# Patient Record
Sex: FEMALE | Race: WHITE | NOT HISPANIC OR LATINO | Employment: FULL TIME | URBAN - METROPOLITAN AREA
[De-identification: names, ages, dates, MRNs, and addresses within clinical notes are randomized per-mention and may not be internally consistent; named-entity substitution may affect disease eponyms.]

---

## 2023-08-29 PROBLEM — N76.0 BACTERIAL VAGINOSIS: Status: ACTIVE | Noted: 2019-04-30

## 2023-08-29 PROBLEM — R20.2 PARESTHESIAS: Status: ACTIVE | Noted: 2023-01-20

## 2023-08-29 PROBLEM — E55.9 VITAMIN D DEFICIENCY: Status: ACTIVE | Noted: 2021-03-10

## 2023-08-29 PROBLEM — D25.9 LEIOMYOMA OF UTERUS, UNSPECIFIED: Status: ACTIVE | Noted: 2018-09-18

## 2023-08-29 PROBLEM — M65.312 TRIGGER THUMB, LEFT THUMB: Status: ACTIVE | Noted: 2022-11-08

## 2023-08-29 PROBLEM — N92.6 IRREGULAR BLEEDING: Status: ACTIVE | Noted: 2023-08-29

## 2023-08-29 PROBLEM — N95.1 PERIMENOPAUSE: Status: ACTIVE | Noted: 2023-08-29

## 2023-08-29 PROBLEM — M25.50 ARTHRALGIA: Status: ACTIVE | Noted: 2021-03-10

## 2023-08-29 PROBLEM — R39.15 URINARY URGENCY: Status: ACTIVE | Noted: 2023-02-16

## 2023-08-29 PROBLEM — H65.90 FLUID LEVEL BEHIND TYMPANIC MEMBRANE: Status: ACTIVE | Noted: 2023-03-20

## 2023-08-29 PROBLEM — E03.9 HYPOTHYROIDISM: Status: ACTIVE | Noted: 2018-09-18

## 2023-08-29 PROBLEM — J32.9 SINUSITIS: Status: ACTIVE | Noted: 2023-02-28

## 2023-08-29 PROBLEM — M65.941 TENOSYNOVITIS OF RIGHT HAND: Status: ACTIVE | Noted: 2022-11-08

## 2023-08-29 PROBLEM — N89.4: Status: ACTIVE | Noted: 2023-08-29

## 2023-08-29 PROBLEM — R25.9 INVOLUNTARY MOVEMENTS: Status: ACTIVE | Noted: 2021-03-10

## 2023-08-29 PROBLEM — F41.9 CHRONIC ANXIETY: Status: ACTIVE | Noted: 2019-09-20

## 2023-08-29 PROBLEM — M65.9 TENOSYNOVITIS OF RIGHT HAND: Status: ACTIVE | Noted: 2022-11-08

## 2023-08-29 PROBLEM — N95.1 FEMALE CLIMACTERIC STATE: Status: ACTIVE | Noted: 2023-08-29

## 2023-08-29 PROBLEM — E78.5 HYPERLIPIDEMIA: Status: ACTIVE | Noted: 2018-09-18

## 2023-08-29 PROBLEM — K21.9 GASTROESOPHAGEAL REFLUX DISEASE: Status: ACTIVE | Noted: 2020-01-16

## 2023-08-29 PROBLEM — R00.2 PALPITATION: Status: ACTIVE | Noted: 2021-09-27

## 2023-08-29 PROBLEM — R25.3 MUSCLE TWITCHING: Status: ACTIVE | Noted: 2021-03-10

## 2023-08-29 PROBLEM — F41.9 ANXIETY DISORDER, UNSPECIFIED: Status: ACTIVE | Noted: 2018-09-18

## 2023-08-29 PROBLEM — S46.819A STRAIN OF TRAPEZIUS MUSCLE: Status: ACTIVE | Noted: 2021-03-10

## 2023-08-29 PROBLEM — N76.1 CHRONIC VAGINITIS: Status: ACTIVE | Noted: 2023-08-29

## 2023-08-29 PROBLEM — B96.89 BACTERIAL VAGINOSIS: Status: ACTIVE | Noted: 2019-04-30

## 2023-08-29 PROBLEM — H66.92 LEFT OTITIS MEDIA: Status: ACTIVE | Noted: 2023-07-08

## 2023-08-29 RX ORDER — OLOPATADINE HYDROCHLORIDE 2 MG/ML
SOLUTION/ DROPS OPHTHALMIC
COMMUNITY
Start: 2022-10-06

## 2023-08-29 RX ORDER — GLUCOSAM/CHONDRO/HERB 149/HYAL 750-100 MG
TABLET ORAL
COMMUNITY
Start: 2020-10-06

## 2023-08-29 RX ORDER — FLUTICASONE PROPIONATE 50 MCG
SPRAY, SUSPENSION (ML) NASAL
COMMUNITY
Start: 2023-06-22

## 2023-08-29 RX ORDER — FERROUS GLUCONATE 324(38)MG
TABLET ORAL
COMMUNITY
End: 2024-04-24 | Stop reason: SDUPTHER

## 2023-08-29 RX ORDER — LORATADINE 10 MG/1
TABLET ORAL
COMMUNITY
Start: 2022-10-06 | End: 2023-11-09 | Stop reason: WASHOUT

## 2023-08-29 RX ORDER — CHOLECALCIFEROL (VITAMIN D3) 25 MCG
TABLET ORAL
COMMUNITY
Start: 2020-10-06

## 2023-08-29 RX ORDER — MELOXICAM 15 MG/1
TABLET ORAL
COMMUNITY
Start: 2022-10-06

## 2023-08-29 RX ORDER — LEVOTHYROXINE SODIUM 75 UG/1
TABLET ORAL
COMMUNITY
End: 2023-11-27 | Stop reason: SDUPTHER

## 2023-08-29 RX ORDER — FAMOTIDINE 20 MG/1
TABLET, FILM COATED ORAL
COMMUNITY
End: 2023-11-09 | Stop reason: WASHOUT

## 2023-08-29 RX ORDER — FAMOTIDINE 10 MG/1
TABLET ORAL
COMMUNITY
Start: 2020-10-05

## 2023-08-29 RX ORDER — ESCITALOPRAM OXALATE 5 MG/1
TABLET ORAL
COMMUNITY
End: 2023-10-27 | Stop reason: SDUPTHER

## 2023-08-29 RX ORDER — TERCONAZOLE 4 MG/G
CREAM VAGINAL
COMMUNITY
Start: 2023-04-25 | End: 2023-11-09 | Stop reason: WASHOUT

## 2023-08-29 RX ORDER — CALCIUM CARBONATE/VITAMIN D3 600MG-5MCG
TABLET ORAL
COMMUNITY
Start: 2022-10-06

## 2023-08-29 RX ORDER — ESCITALOPRAM OXALATE 10 MG/1
TABLET ORAL
COMMUNITY
Start: 2023-02-21 | End: 2023-11-09 | Stop reason: WASHOUT

## 2023-08-29 RX ORDER — MULTIVIT WITH CALCIUM,IRON,MIN 18MG-0.4MG
TABLET ORAL
COMMUNITY

## 2023-09-20 ENCOUNTER — HOSPITAL ENCOUNTER (OUTPATIENT)
Dept: DATA CONVERSION | Facility: HOSPITAL | Age: 58
Discharge: HOME | End: 2023-09-20
Payer: COMMERCIAL

## 2023-09-20 DIAGNOSIS — E03.9 HYPOTHYROIDISM, UNSPECIFIED: ICD-10-CM

## 2023-09-20 DIAGNOSIS — Z00.00 ENCOUNTER FOR GENERAL ADULT MEDICAL EXAMINATION WITHOUT ABNORMAL FINDINGS: ICD-10-CM

## 2023-09-20 DIAGNOSIS — E55.9 VITAMIN D DEFICIENCY, UNSPECIFIED: ICD-10-CM

## 2023-09-20 DIAGNOSIS — E78.5 HYPERLIPIDEMIA, UNSPECIFIED: ICD-10-CM

## 2023-09-20 DIAGNOSIS — R20.2 PARESTHESIA OF SKIN: ICD-10-CM

## 2023-09-20 LAB
25(OH)D3 SERPL-MCNC: 38 NG/ML (ref 31–100)
ALBUMIN SERPL-MCNC: 4.4 GM/DL (ref 3.5–5)
ALBUMIN/GLOB SERPL: 1.6 RATIO (ref 1.5–3)
ALP BLD-CCNC: 92 U/L (ref 35–125)
ALT SERPL-CCNC: 13 U/L (ref 5–40)
ANION GAP SERPL CALCULATED.3IONS-SCNC: 10 MMOL/L (ref 0–19)
APPEARANCE PLAS: ABNORMAL
AST SERPL-CCNC: 17 U/L (ref 5–40)
BACTERIA UR QL AUTO: NEGATIVE
BASOPHILS # BLD AUTO: 0.04 K/UL (ref 0–0.22)
BASOPHILS NFR BLD AUTO: 0.8 % (ref 0–1)
BILIRUB SERPL-MCNC: 0.5 MG/DL (ref 0.1–1.2)
BILIRUB UR QL STRIP.AUTO: NEGATIVE
BUN SERPL-MCNC: 11 MG/DL (ref 8–25)
BUN/CREAT SERPL: 13.8 RATIO (ref 8–21)
CALCIUM SERPL-MCNC: 9.6 MG/DL (ref 8.5–10.4)
CHLORIDE SERPL-SCNC: 104 MMOL/L (ref 97–107)
CHOLEST SERPL-MCNC: 223 MG/DL (ref 133–200)
CHOLEST/HDLC SERPL: 5.2 RATIO
CLARITY UR: CLEAR
CO2 SERPL-SCNC: 28 MMOL/L (ref 24–31)
COLOR SPUN FLD: ABNORMAL
COLOR UR: ABNORMAL
CREAT SERPL-MCNC: 0.8 MG/DL (ref 0.4–1.6)
DEPRECATED RDW RBC AUTO: 39.8 FL (ref 37–54)
DIFFERENTIAL METHOD BLD: ABNORMAL
EOSINOPHIL # BLD AUTO: 0.14 K/UL (ref 0–0.45)
EOSINOPHIL NFR BLD: 3 % (ref 0–3)
ERYTHROCYTE [DISTWIDTH] IN BLOOD BY AUTOMATED COUNT: 11.9 % (ref 11.7–15)
FASTING STATUS PATIENT QL REPORTED: ABNORMAL
GFR SERPL CREATININE-BSD FRML MDRD: 87 ML/MIN/1.73 M2
GLOBULIN SER-MCNC: 2.7 G/DL (ref 1.9–3.7)
GLUCOSE SERPL-MCNC: 91 MG/DL (ref 65–99)
GLUCOSE UR STRIP.AUTO-MCNC: NEGATIVE MG/DL
HCT VFR BLD AUTO: 39.7 % (ref 36–44)
HDLC SERPL-MCNC: 43 MG/DL
HGB BLD-MCNC: 13.2 GM/DL (ref 12–15)
HGB UR QL STRIP.AUTO: 3 /HPF (ref 0–3)
HGB UR QL: NEGATIVE
HYALINE CASTS UR QL AUTO: 6 /LPF
IMM GRANULOCYTES # BLD AUTO: 0.01 K/UL (ref 0–0.1)
KETONES UR QL STRIP.AUTO: NEGATIVE
LDLC SERPL CALC-MCNC: 154 MG/DL (ref 65–130)
LEUKOCYTE ESTERASE UR QL STRIP.AUTO: ABNORMAL
LYMPHOCYTES # BLD AUTO: 1.83 K/UL (ref 1.2–3.2)
LYMPHOCYTES NFR BLD MANUAL: 38.9 % (ref 20–40)
MCH RBC QN AUTO: 30.1 PG (ref 26–34)
MCHC RBC AUTO-ENTMCNC: 33.2 % (ref 31–37)
MCV RBC AUTO: 90.6 FL (ref 80–100)
MONOCYTES # BLD AUTO: 0.5 K/UL (ref 0–0.8)
MONOCYTES NFR BLD MANUAL: 10.6 % (ref 0–8)
NEUTROPHILS # BLD AUTO: 2.19 K/UL
NEUTROPHILS # BLD AUTO: 2.19 K/UL (ref 1.8–7.7)
NEUTROPHILS.IMMATURE NFR BLD: 0.2 % (ref 0–1)
NEUTS SEG NFR BLD: 46.5 % (ref 50–70)
NITRITE UR QL STRIP.AUTO: NEGATIVE
NRBC BLD-RTO: 0 /100 WBC
PH UR STRIP.AUTO: 5.5 [PH] (ref 4.6–8)
PLATELET # BLD AUTO: 209 K/UL (ref 150–450)
PMV BLD AUTO: 10.7 CU (ref 7–12.6)
POTASSIUM SERPL-SCNC: 4.1 MMOL/L (ref 3.4–5.1)
PROT SERPL-MCNC: 7.1 G/DL (ref 5.9–7.9)
PROT UR STRIP.AUTO-MCNC: NEGATIVE MG/DL
RBC # BLD AUTO: 4.38 M/UL (ref 4–4.9)
REFLEX MICROSCOPIC (UA): ABNORMAL
SODIUM SERPL-SCNC: 142 MMOL/L (ref 133–145)
SP GR UR STRIP.AUTO: 1.02 (ref 1–1.03)
SQUAMOUS UR QL AUTO: ABNORMAL /HPF
TRIGL SERPL-MCNC: 129 MG/DL (ref 40–150)
TSH SERPL DL<=0.05 MIU/L-ACNC: 1.2 MIU/L (ref 0.27–4.2)
UNSPECIFIED CRY UR QL COMP ASSIST: ABNORMAL
UROBILINOGEN UR QL STRIP.AUTO: NORMAL MG/DL (ref 0–1)
VIT B12 SERPL-MCNC: 388 PG/ML (ref 211–946)
WBC # BLD AUTO: 4.7 K/UL (ref 4.5–11)
WBC #/AREA URNS AUTO: 15 /HPF (ref 0–3)

## 2023-09-25 LAB — VIT B6 SERPL-MCNC: NORMAL NG/ML

## 2023-10-06 ENCOUNTER — TELEPHONE (OUTPATIENT)
Dept: PRIMARY CARE | Facility: CLINIC | Age: 58
End: 2023-10-06
Payer: COMMERCIAL

## 2023-10-27 ENCOUNTER — TELEPHONE (OUTPATIENT)
Dept: PRIMARY CARE | Facility: CLINIC | Age: 58
End: 2023-10-27
Payer: COMMERCIAL

## 2023-10-27 DIAGNOSIS — F41.1 GENERALIZED ANXIETY DISORDER: ICD-10-CM

## 2023-10-27 RX ORDER — ESCITALOPRAM OXALATE 5 MG/1
5 TABLET ORAL DAILY
Qty: 90 TABLET | Refills: 1 | Status: SHIPPED | OUTPATIENT
Start: 2023-10-27 | End: 2023-11-09 | Stop reason: WASHOUT

## 2023-11-09 ENCOUNTER — OFFICE VISIT (OUTPATIENT)
Dept: OBSTETRICS AND GYNECOLOGY | Facility: CLINIC | Age: 58
End: 2023-11-09
Payer: COMMERCIAL

## 2023-11-09 VITALS
HEIGHT: 64 IN | BODY MASS INDEX: 25.27 KG/M2 | WEIGHT: 148 LBS | DIASTOLIC BLOOD PRESSURE: 62 MMHG | SYSTOLIC BLOOD PRESSURE: 118 MMHG

## 2023-11-09 DIAGNOSIS — Z12.12 SCREENING FOR COLORECTAL CANCER: ICD-10-CM

## 2023-11-09 DIAGNOSIS — Z01.419 WELL FEMALE EXAM WITH ROUTINE GYNECOLOGICAL EXAM: Primary | ICD-10-CM

## 2023-11-09 DIAGNOSIS — Z30.431 SURVEILLANCE OF INTRAUTERINE CONTRACEPTIVE DEVICE: ICD-10-CM

## 2023-11-09 DIAGNOSIS — Z11.51 SCREENING FOR HPV (HUMAN PAPILLOMAVIRUS): ICD-10-CM

## 2023-11-09 DIAGNOSIS — Z12.11 SCREENING FOR COLORECTAL CANCER: ICD-10-CM

## 2023-11-09 DIAGNOSIS — Z78.0 MENOPAUSE: ICD-10-CM

## 2023-11-09 DIAGNOSIS — R35.0 URINARY FREQUENCY: ICD-10-CM

## 2023-11-09 LAB
POC APPEARANCE, URINE: CLEAR
POC BILIRUBIN, URINE: NEGATIVE
POC BLOOD, URINE: NEGATIVE
POC COLOR, URINE: YELLOW
POC GLUCOSE, URINE: NEGATIVE MG/DL
POC KETONES, URINE: NEGATIVE MG/DL
POC LEUKOCYTES, URINE: ABNORMAL
POC NITRITE,URINE: NEGATIVE
POC PH, URINE: 6 PH
POC PROTEIN, URINE: NEGATIVE MG/DL
POC SPECIFIC GRAVITY, URINE: 1.01
POC UROBILINOGEN, URINE: 0.2 EU/DL

## 2023-11-09 PROCEDURE — 99396 PREV VISIT EST AGE 40-64: CPT | Performed by: OBSTETRICS & GYNECOLOGY

## 2023-11-09 PROCEDURE — 87624 HPV HI-RISK TYP POOLED RSLT: CPT | Performed by: OBSTETRICS & GYNECOLOGY

## 2023-11-09 PROCEDURE — 81003 URINALYSIS AUTO W/O SCOPE: CPT | Performed by: OBSTETRICS & GYNECOLOGY

## 2023-11-09 PROCEDURE — 88175 CYTOPATH C/V AUTO FLUID REDO: CPT | Mod: TC | Performed by: OBSTETRICS & GYNECOLOGY

## 2023-11-09 PROCEDURE — 87086 URINE CULTURE/COLONY COUNT: CPT | Performed by: OBSTETRICS & GYNECOLOGY

## 2023-11-09 PROCEDURE — 1036F TOBACCO NON-USER: CPT | Performed by: OBSTETRICS & GYNECOLOGY

## 2023-11-09 ASSESSMENT — ENCOUNTER SYMPTOMS
DIFFICULTY URINATING: 0
ADENOPATHY: 0
DYSURIA: 0
DIZZINESS: 0
ABDOMINAL PAIN: 0
CHEST TIGHTNESS: 0
ABDOMINAL DISTENTION: 0
SHORTNESS OF BREATH: 0
UNEXPECTED WEIGHT CHANGE: 0
FATIGUE: 0
HEADACHES: 0
ACTIVITY CHANGE: 0
WEAKNESS: 0
JOINT SWELLING: 0
COLOR CHANGE: 0

## 2023-11-09 ASSESSMENT — LIFESTYLE VARIABLES
HOW MANY STANDARD DRINKS CONTAINING ALCOHOL DO YOU HAVE ON A TYPICAL DAY: 1 OR 2
HOW OFTEN DO YOU HAVE A DRINK CONTAINING ALCOHOL: MONTHLY OR LESS
HOW OFTEN DO YOU HAVE SIX OR MORE DRINKS ON ONE OCCASION: NEVER
AUDIT-C TOTAL SCORE: 1
SKIP TO QUESTIONS 9-10: 1

## 2023-11-09 ASSESSMENT — PATIENT HEALTH QUESTIONNAIRE - PHQ9
SUM OF ALL RESPONSES TO PHQ9 QUESTIONS 1 & 2: 0
1. LITTLE INTEREST OR PLEASURE IN DOING THINGS: NOT AT ALL
2. FEELING DOWN, DEPRESSED OR HOPELESS: NOT AT ALL

## 2023-11-09 NOTE — PROGRESS NOTES
Annual-menopause  Subjective   Kalli Quintero is a 57 y.o. female who is here for a routine exam. Reports urinary frequency suspicious of bladder infection which she attributes to lack of hydration recently and opaque vaginal discharge. Also has been experiencing dyspareunia despite use of uberlube. Denies breast changes.  Periods: menopausal  Pain: periodic pelvic tenderness  Past Medical History       HYPOTHYROIDISM.       UTERINE FIBROIDS .       REFLUX.       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       Recurrent vaginitis;some yeast; also atrophic.  Surgical History        D&C per SP; NO discrete intracavitary lesions found 12/13/16  Last pap smear date 09/23/2020-neg,hpv-neg, 9/06/17 neg, hpv neg; 2012.   Mammogram dates 12/23/2021-neg, 08/11/2021- UNILAT RT mamm for pain/4mm cyst found;12/03/2020-neg, 11/19/19 neg, 11/27/18 BENIGN, 11/30/17 amelia; 11/2015.   Abnormal Mammogram 08/2021 4mm cyst RT breast.   Pelvic Ultrasound 10/2016 endometrial polyps(NEG h/s eval); both intramural/submucosal fibroids.   Birth control VASECTOMY, MIRENA INSERTED 4/20/17  Menarche 17.   LMP  6/19/2021 spotting/on Mirena. 2021 FSH 78.6; 2022 FSH 45  STDs NONE.   Sexual activity currently sexually active.   Total pregnancies  2.   Total live births  2---largest 9 lbs. ( No GDM).     Review of Systems   Constitutional:  Negative for activity change, fatigue and unexpected weight change.   Respiratory:  Negative for chest tightness and shortness of breath.    Cardiovascular:  Negative for chest pain and leg swelling.   Gastrointestinal:  Negative for abdominal distention and abdominal pain.   Genitourinary:  Positive for dyspareunia, frequency, urgency, vaginal discharge and vaginal pain. Negative for difficulty urinating, dysuria, genital sores, pelvic pain and vaginal bleeding.   Musculoskeletal:  Negative for gait problem and joint swelling.   Skin:  Negative for color change and rash.   Neurological:  Negative for dizziness, weakness and headaches.  "  Hematological:  Negative for adenopathy.   Objective   Visit Vitals  /62   Ht 1.626 m (5' 4\")   Wt 67.1 kg (148 lb)   LMP  (LMP Unknown)   BMI 25.40 kg/m²   OB Status Implant   Smoking Status Never   BSA 1.74 m²       General:   Alert and oriented, in no acute distress   Neck: Supple. No visible thyromegaly.    Breast/Axilla: Normal to palpation bilaterally without masses, skin changes, or nipple discharge.    Abdomen: Soft, non-tender, without masses or organomegaly   Vulva: Normal architecture without erythema, masses, or lesions.    Vagina: Mildly atrophic mucosa, improved on tx; egg white like discharge only at present; good mucosal moisture/nl vault capacity in canal; no lesions or blood .    Cervix: Normal without masses, lesions, or signs of cervicitis. Iud strings visible   Uterus:  Grossly normal mobile, non-enlarged uterus    Adnexa: No palp  masses or tenderness   Pelvic Floor No POP noted. No high tone pelvic floor    Psych  Rectal Normal affect. Normal mood.        Assessment/Plan    Encounter Diagnoses   Name Primary?    Well female exam with routine gynecological exam; grossly nl brst exam; gyn exam pos for ongoing but improved gsm. Pericare reviewed. Yes    Screening for HPV (human papillomavirus)     Menopause; no sgnf vaso sxs; gsm addressed.     Surveillance of intrauterine contraceptive device; no menses ; shared decision to continue due to hx mmr and well tolerated; pt would like to obtain full 7 yrs endometrial protection/suppression.     Screening for colorectal cancer; utd per pt     Urinary frequency; ua equivocal; will send for culture.    Racheal Nicolas MD    "

## 2023-11-10 PROBLEM — R35.0 URINARY FREQUENCY: Status: ACTIVE | Noted: 2023-11-10

## 2023-11-10 LAB — BACTERIA UR CULT: NORMAL

## 2023-11-10 ASSESSMENT — ENCOUNTER SYMPTOMS: FREQUENCY: 1

## 2023-11-27 ENCOUNTER — HOSPITAL ENCOUNTER (OUTPATIENT)
Dept: RADIOLOGY | Facility: HOSPITAL | Age: 58
Discharge: HOME | End: 2023-11-27
Payer: COMMERCIAL

## 2023-11-27 VITALS — BODY MASS INDEX: 24.92 KG/M2 | WEIGHT: 146 LBS | HEIGHT: 64 IN

## 2023-11-27 DIAGNOSIS — Z12.31 ENCOUNTER FOR SCREENING MAMMOGRAM FOR MALIGNANT NEOPLASM OF BREAST: ICD-10-CM

## 2023-11-27 PROCEDURE — 77067 SCR MAMMO BI INCL CAD: CPT

## 2023-11-30 LAB
CYTOLOGY CMNT CVX/VAG CYTO-IMP: NORMAL
HPV HR 12 DNA GENITAL QL NAA+PROBE: NEGATIVE
HPV HR GENOTYPES PNL CVX NAA+PROBE: NEGATIVE
HPV16 DNA SPEC QL NAA+PROBE: NEGATIVE
HPV18 DNA SPEC QL NAA+PROBE: NEGATIVE
LAB AP CONTRACEPTIVE HISTORY: NORMAL
LAB AP HPV GENOTYPE QUESTION: YES
LAB AP HPV HR: NORMAL
LABORATORY COMMENT REPORT: NORMAL
LMP START DATE: NORMAL
MENSTRUAL HX REPORTED: NORMAL
PATH REPORT.TOTAL CANCER: NORMAL

## 2024-02-09 ENCOUNTER — TELEPHONE (OUTPATIENT)
Dept: PRIMARY CARE | Facility: CLINIC | Age: 59
End: 2024-02-09
Payer: COMMERCIAL

## 2024-02-09 DIAGNOSIS — F41.9 ANXIETY DISORDER, UNSPECIFIED TYPE: ICD-10-CM

## 2024-02-09 RX ORDER — ESCITALOPRAM OXALATE 10 MG/1
5 TABLET ORAL DAILY
Qty: 45 TABLET | Refills: 1 | Status: SHIPPED | OUTPATIENT
Start: 2024-02-09 | End: 2024-02-13 | Stop reason: SDUPTHER

## 2024-02-09 NOTE — TELEPHONE ENCOUNTER
RX REQUEST   [facility]      PT WILL ALSO NEED  RX SENT TO [facility] TILL THIS COMES IN .

## 2024-02-13 ENCOUNTER — TELEPHONE (OUTPATIENT)
Dept: PRIMARY CARE | Facility: CLINIC | Age: 59
End: 2024-02-13
Payer: COMMERCIAL

## 2024-02-13 DIAGNOSIS — F41.9 ANXIETY DISORDER, UNSPECIFIED TYPE: ICD-10-CM

## 2024-02-13 RX ORDER — ESCITALOPRAM OXALATE 10 MG/1
5 TABLET ORAL DAILY
Qty: 7 TABLET | Refills: 0 | Status: SHIPPED | OUTPATIENT
Start: 2024-02-13 | End: 2024-05-17 | Stop reason: SDUPTHER

## 2024-02-13 NOTE — TELEPHONE ENCOUNTER
RX REQUEST  [facility]     PT  ONLY HAS ONE LEFT.   NEEDS SOME TILL HER MAIL ORDER COMES  IN .

## 2024-02-14 ENCOUNTER — TELEPHONE (OUTPATIENT)
Dept: PRIMARY CARE | Facility: CLINIC | Age: 59
End: 2024-02-14
Payer: COMMERCIAL

## 2024-02-14 NOTE — TELEPHONE ENCOUNTER
PLEASE UPDATE PHARMACY TO [facility] LAST 2 RX'S HAVE BEEN SENT TO [facility]. SHE DOESN'T NEED ANY MORE REFILLS AT THE MOMENT. JUST UPDATE PHARMACY

## 2024-03-27 ENCOUNTER — OFFICE VISIT (OUTPATIENT)
Dept: PRIMARY CARE | Facility: CLINIC | Age: 59
End: 2024-03-27
Payer: COMMERCIAL

## 2024-03-27 VITALS
HEART RATE: 72 BPM | SYSTOLIC BLOOD PRESSURE: 110 MMHG | WEIGHT: 148 LBS | BODY MASS INDEX: 25.4 KG/M2 | TEMPERATURE: 98.3 F | OXYGEN SATURATION: 98 % | DIASTOLIC BLOOD PRESSURE: 60 MMHG

## 2024-03-27 DIAGNOSIS — E03.8 OTHER SPECIFIED HYPOTHYROIDISM: ICD-10-CM

## 2024-03-27 DIAGNOSIS — J06.9 ACUTE URI: ICD-10-CM

## 2024-03-27 DIAGNOSIS — H66.002 NON-RECURRENT ACUTE SUPPURATIVE OTITIS MEDIA OF LEFT EAR WITHOUT SPONTANEOUS RUPTURE OF TYMPANIC MEMBRANE: Primary | ICD-10-CM

## 2024-03-27 PROCEDURE — 99214 OFFICE O/P EST MOD 30 MIN: CPT | Performed by: NURSE PRACTITIONER

## 2024-03-27 RX ORDER — AMOXICILLIN 875 MG/1
875 TABLET, FILM COATED ORAL 2 TIMES DAILY
Qty: 14 TABLET | Refills: 0 | Status: SHIPPED | OUTPATIENT
Start: 2024-03-27 | End: 2024-04-03

## 2024-03-27 ASSESSMENT — ENCOUNTER SYMPTOMS
SHORTNESS OF BREATH: 0
NAUSEA: 0
WHEEZING: 0
HEADACHES: 1
SORE THROAT: 0
VOMITING: 0
FATIGUE: 1
CHILLS: 0
COUGH: 1
SINUS PRESSURE: 1
DIARRHEA: 0
SINUS PAIN: 1
RHINORRHEA: 1
DIZZINESS: 0
FEVER: 0

## 2024-03-27 ASSESSMENT — PAIN SCALES - GENERAL: PAINLEVEL: 1

## 2024-03-27 NOTE — PROGRESS NOTES
Subjective   Patient ID: Kalli Quintero is a 58 y.o. female who presents for Earache (Bilateral ear pain, sinus pressure, congestion, and cough. X5 days.).    HPI   Kalli is a 57 yo F who presents with ear pain and sinus congestion for the past 5 days  Recnetly had Gi bug in [place] and came home 3/17  Sx began 3/22 - congestion, ear pain, sinus pressure  Home COVID19 x2 negative  Using Neti pot, pseudophed    Review of Systems   Constitutional:  Positive for fatigue. Negative for chills and fever.   HENT:  Positive for congestion, ear pain, postnasal drip, rhinorrhea, sinus pressure, sinus pain and sneezing. Negative for sore throat.    Respiratory:  Positive for cough. Negative for shortness of breath and wheezing.    Cardiovascular:  Negative for chest pain.   Gastrointestinal:  Negative for diarrhea, nausea and vomiting.   Skin:  Negative for rash.   Neurological:  Positive for headaches (foggy). Negative for dizziness.       Objective   /60   Pulse 72   Temp 36.8 °C (98.3 °F)   Wt 67.1 kg (148 lb)   SpO2 98%   BMI 25.40 kg/m²     Physical Exam  Gen: A/O x3 NAD  Eyes: WNL  ENT: Left TM erythemic and bulgingWith fluid level noted. Right TM dull. Auditory canals wnl. Bilat nares red and Patent with clear rhinorrhea. + Posterior pharynx erythema. No exudate. Uvula midline.   Lungs: Breath sounds Clear to auscultation bilaterally. No wheeze. Speaks complete sentences/Unlabored.  Heart: RRR, no murmur  Neck: supple, no adenopathy  Skin: warm/dry, no rash  Neuro: grossly intact    Assessment/Plan   Diagnoses and all orders for this visit:  Non-recurrent acute suppurative otitis media of left ear without spontaneous rupture of tympanic membrane  -     amoxicillin (Amoxil) 875 mg tablet; Take 1 tablet (875 mg) by mouth 2 times a day for 7 days.  Needs better control  Will cover with antibiotic due to otitis media  Add Flonase and continue antihistamines  Mucinex  Follow-up if not improving in 7 to 10 days  Acute  URI  Needs better control  Covered with antibiotic due to otitis media  OTC as directed  Fluids, rest, humidifier  Follow-up 7 to 10 days if not improving  Other specified hypothyroidism  -     TSH with reflex to Free T4 if abnormal; Future  Pt with hx of hypothyroid  Has been trying to follow grounding practice  Would like tsh checked

## 2024-04-22 ENCOUNTER — TELEPHONE (OUTPATIENT)
Dept: PRIMARY CARE | Facility: CLINIC | Age: 59
End: 2024-04-22

## 2024-04-22 DIAGNOSIS — Z00.00 ROUTINE MEDICAL EXAM: ICD-10-CM

## 2024-04-22 DIAGNOSIS — E03.8 OTHER SPECIFIED HYPOTHYROIDISM: ICD-10-CM

## 2024-04-22 DIAGNOSIS — E55.9 VITAMIN D DEFICIENCY: ICD-10-CM

## 2024-04-22 DIAGNOSIS — E78.5 HYPERLIPIDEMIA, UNSPECIFIED HYPERLIPIDEMIA TYPE: ICD-10-CM

## 2024-04-24 ENCOUNTER — TELEPHONE (OUTPATIENT)
Dept: PRIMARY CARE | Facility: CLINIC | Age: 59
End: 2024-04-24
Payer: COMMERCIAL

## 2024-04-24 DIAGNOSIS — N92.6 IRREGULAR BLEEDING: ICD-10-CM

## 2024-04-24 RX ORDER — FERROUS GLUCONATE 324(38)MG
TABLET ORAL
Qty: 90 TABLET | Refills: 0 | Status: SHIPPED | OUTPATIENT
Start: 2024-04-24

## 2024-04-24 NOTE — TELEPHONE ENCOUNTER
Patient came in requesting a refill for Ferrous Gluconate 324 mg.  Pharmacy: [facility]  Patient phone #: [phone]

## 2024-05-02 DIAGNOSIS — E03.9 HYPOTHYROIDISM, UNSPECIFIED TYPE: ICD-10-CM

## 2024-05-02 RX ORDER — LEVOTHYROXINE SODIUM 75 UG/1
75 TABLET ORAL
Qty: 90 TABLET | Refills: 1 | Status: SHIPPED | OUTPATIENT
Start: 2024-05-02

## 2024-06-11 ENCOUNTER — LAB (OUTPATIENT)
Dept: LAB | Facility: LAB | Age: 59
End: 2024-06-11
Payer: COMMERCIAL

## 2024-06-11 DIAGNOSIS — E03.8 OTHER SPECIFIED HYPOTHYROIDISM: ICD-10-CM

## 2024-06-11 LAB — TSH SERPL-ACNC: 1.38 MIU/L (ref 0.44–3.98)

## 2024-06-11 PROCEDURE — 36415 COLL VENOUS BLD VENIPUNCTURE: CPT

## 2024-06-13 ENCOUNTER — OFFICE VISIT (OUTPATIENT)
Dept: PRIMARY CARE | Facility: CLINIC | Age: 59
End: 2024-06-13
Payer: COMMERCIAL

## 2024-06-13 VITALS
WEIGHT: 150.4 LBS | OXYGEN SATURATION: 96 % | HEART RATE: 70 BPM | DIASTOLIC BLOOD PRESSURE: 70 MMHG | BODY MASS INDEX: 25.82 KG/M2 | SYSTOLIC BLOOD PRESSURE: 110 MMHG

## 2024-06-13 DIAGNOSIS — H69.93 EUSTACHIAN TUBE DYSFUNCTION, BILATERAL: ICD-10-CM

## 2024-06-13 DIAGNOSIS — H93.8X3 EAR FULLNESS, BILATERAL: Primary | ICD-10-CM

## 2024-06-13 PROCEDURE — 1036F TOBACCO NON-USER: CPT | Performed by: NURSE PRACTITIONER

## 2024-06-13 PROCEDURE — 99213 OFFICE O/P EST LOW 20 MIN: CPT | Performed by: NURSE PRACTITIONER

## 2024-06-13 RX ORDER — AMOXICILLIN 875 MG/1
875 TABLET, FILM COATED ORAL 2 TIMES DAILY
Qty: 20 TABLET | Refills: 0 | Status: SHIPPED | OUTPATIENT
Start: 2024-06-13 | End: 2024-06-23

## 2024-06-13 ASSESSMENT — PATIENT HEALTH QUESTIONNAIRE - PHQ9
2. FEELING DOWN, DEPRESSED OR HOPELESS: NOT AT ALL
SUM OF ALL RESPONSES TO PHQ9 QUESTIONS 1 AND 2: 0
1. LITTLE INTEREST OR PLEASURE IN DOING THINGS: NOT AT ALL

## 2024-06-13 ASSESSMENT — ENCOUNTER SYMPTOMS
SINUS PRESSURE: 0
CARDIOVASCULAR NEGATIVE: 1
RHINORRHEA: 0
SORE THROAT: 0
CHILLS: 0
FEVER: 0
SINUS PAIN: 0
GASTROINTESTINAL NEGATIVE: 1
DIZZINESS: 1
RESPIRATORY NEGATIVE: 1

## 2024-06-13 ASSESSMENT — PAIN SCALES - GENERAL: PAINLEVEL: 0-NO PAIN

## 2024-06-13 NOTE — PROGRESS NOTES
Subjective   Patient ID: Kalli Quintero is a 58 y.o. female who presents for Ear Fullness (Reports clogged ears, L>R, and being light headed x winter 2023 ).    HPI   Kalli is a 57 yo F who complains of ear fullness, clogged sensation that has been intermittently occurring this winter season. She occasionally has dizziness with position change. Using flonase, oral antihistamine and saline nasal spray daily. Is concerned she has infection and will be traveling to [place] next week.     Review of Systems   Constitutional:  Negative for chills and fever.   HENT:  Positive for ear pain. Negative for congestion, hearing loss, rhinorrhea, sinus pressure, sinus pain and sore throat.    Respiratory: Negative.     Cardiovascular: Negative.    Gastrointestinal: Negative.    Neurological:  Positive for dizziness.       Objective   /70 (BP Location: Left arm)   Pulse 70   Wt 68.2 kg (150 lb 6.4 oz)   SpO2 96%   BMI 25.82 kg/m²     Physical Exam  A/O x3 NAD  Head NC/AT  Eyes normal. EOMI/PERRL  Bilateral TM dull with decreased light reflex. No redness.   Nares pink/boggy. Oropharynx clear with MMM  Neck supple. No lymphadenopathy  Breathing unlabored  CN 2-12 intact. Dewayne    Assessment/Plan   Diagnoses and all orders for this visit:  Ear fullness, bilateral  -     amoxicillin (Amoxil) 875 mg tablet; Take 1 tablet (875 mg) by mouth 2 times a day for 10 days.  -     Referral to ENT; Future  Eustachian tube dysfunction, bilateral  -     amoxicillin (Amoxil) 875 mg tablet; Take 1 tablet (875 mg) by mouth 2 times a day for 10 days.  -     Referral to ENT; Future  Pt with ongoing intermittently occurring ear fullness. No evidence of OM  Will provide antibiotic to take while traveling overseas for worsening sx  Use flonase, antihistamine, self inflation exercise, afrin prior to air travel.  Due to ongoing nature of complaint, will refer to ENT for evaluation.   Pt in agreement with plan

## 2024-06-13 NOTE — PATIENT INSTRUCTIONS
Continue Flonase and oral antihistamine    Use the antibiotic while traveling overseas for worsening symptoms    See ENT as referred

## 2024-08-14 ENCOUNTER — TELEMEDICINE (OUTPATIENT)
Dept: PRIMARY CARE | Facility: CLINIC | Age: 59
End: 2024-08-14
Payer: COMMERCIAL

## 2024-08-14 ENCOUNTER — TELEPHONE (OUTPATIENT)
Dept: PRIMARY CARE | Facility: CLINIC | Age: 59
End: 2024-08-14
Payer: COMMERCIAL

## 2024-08-14 DIAGNOSIS — U07.1 COVID-19: Primary | ICD-10-CM

## 2024-08-14 PROCEDURE — 1036F TOBACCO NON-USER: CPT | Performed by: NURSE PRACTITIONER

## 2024-08-14 PROCEDURE — 99213 OFFICE O/P EST LOW 20 MIN: CPT | Performed by: NURSE PRACTITIONER

## 2024-08-14 ASSESSMENT — ENCOUNTER SYMPTOMS
CHEST TIGHTNESS: 1
SINUS PAIN: 1
NAUSEA: 0
HEADACHES: 1
CONSTIPATION: 0
COUGH: 1
SINUS PRESSURE: 1
VOMITING: 0
ARTHRALGIAS: 1
DIARRHEA: 1
SORE THROAT: 1
FEVER: 1
RHINORRHEA: 1
SHORTNESS OF BREATH: 0
EYE DISCHARGE: 1
FATIGUE: 1
POLYPHAGIA: 1

## 2024-08-14 ASSESSMENT — PATIENT HEALTH QUESTIONNAIRE - PHQ9
1. LITTLE INTEREST OR PLEASURE IN DOING THINGS: NOT AT ALL
2. FEELING DOWN, DEPRESSED OR HOPELESS: NOT AT ALL
SUM OF ALL RESPONSES TO PHQ9 QUESTIONS 1 AND 2: 0

## 2024-08-14 ASSESSMENT — PAIN SCALES - GENERAL: PAINLEVEL: 0-NO PAIN

## 2024-08-14 NOTE — PROGRESS NOTES
Subjective   Patient ID: Kalli Quintero is a 58 y.o. female who presents for Covid-19 Home Monitoring Visit (Positive covid test 8/13/Symptoms started 8/9/Sinus congestion, Sore throat, cough, body aches, headache /Had fever on 8/13).    HPI   With patient's permission, this is a Telemedicine visit with video. Provider located at the office address. Patient located at their home address. The provider, patient, and ____ participated in this telemedicine encounter     Sx began 8/9/24   Tested positive on 8/13/24  Has used eye gtts, ibuprofen, flonase, mucinex, cough suppressant  Son was positive for covid 19 prior to her symptom onset    Review of Systems   Constitutional:  Positive for fatigue and fever (as high as 101.5).   HENT:  Positive for congestion, ear pain, postnasal drip, rhinorrhea, sinus pressure, sinus pain and sore throat.    Eyes:  Positive for discharge (watery eyes).   Respiratory:  Positive for cough (nonproductive) and chest tightness. Negative for shortness of breath.    Cardiovascular:  Negative for chest pain.   Gastrointestinal:  Positive for diarrhea (sotfer, more frequent). Negative for constipation, nausea and vomiting.   Endocrine: Positive for polyphagia.   Musculoskeletal:  Positive for arthralgias.   Skin:  Negative for rash.   Neurological:  Positive for headaches.       Objective   There were no vitals taken for this visit.    Physical Exam  Alert oriented x3, no acute distress  breathing unlabored  Speaks full sentences  Answers appropriately  Skin appears normal color  Neuro grossly intact  Exam limited due to tele med platform     Assessment/Plan   Diagnoses and all orders for this visit:  COVID-19  Acute  Pt positive for covid 19 with home test  OTC as directed  Paxlovid rx discussed and deferred at this time  Quarantine guidelines discussed  Follow up 7-10 days INB  Indications to go to ED reviewed

## 2024-08-14 NOTE — TELEPHONE ENCOUNTER
Patient called answering service 8-13 at 703 pm tested + for covid fever 101  Per Keshia spoke to pt discussed SX relief options she is interested in paxlovid advised her to diane for an appt tomorrow with PCP to discuss prescription stable today no SOB or C

## 2024-08-20 ENCOUNTER — APPOINTMENT (OUTPATIENT)
Dept: PRIMARY CARE | Facility: CLINIC | Age: 59
End: 2024-08-20
Payer: COMMERCIAL

## 2024-08-20 VITALS
OXYGEN SATURATION: 99 % | WEIGHT: 154 LBS | DIASTOLIC BLOOD PRESSURE: 78 MMHG | BODY MASS INDEX: 26.43 KG/M2 | SYSTOLIC BLOOD PRESSURE: 130 MMHG | TEMPERATURE: 97.6 F | HEART RATE: 91 BPM

## 2024-08-20 DIAGNOSIS — H65.93 FLUID LEVEL BEHIND TYMPANIC MEMBRANE OF BOTH EARS: Primary | ICD-10-CM

## 2024-08-20 PROCEDURE — 99213 OFFICE O/P EST LOW 20 MIN: CPT | Performed by: NURSE PRACTITIONER

## 2024-08-20 ASSESSMENT — PAIN SCALES - GENERAL: PAINLEVEL: 0-NO PAIN

## 2024-08-20 ASSESSMENT — PATIENT HEALTH QUESTIONNAIRE - PHQ9
1. LITTLE INTEREST OR PLEASURE IN DOING THINGS: NOT AT ALL
SUM OF ALL RESPONSES TO PHQ9 QUESTIONS 1 AND 2: 0
2. FEELING DOWN, DEPRESSED OR HOPELESS: NOT AT ALL

## 2024-08-20 NOTE — PROGRESS NOTES
Subjective   Patient ID: Kalli Quintero is a 58 y.o. female who presents for Ear Fullness.    HPI   Pt is a 59 yo F who presents with bilateral ear fullness - she is concerned she could have an infetion  Hx of covid19  8/13/24 with sx starting 8/9/24  Pt with ear fullness, pain and clicking  Hearing intact  Took pseudophed yesterday with relief    Review of Systems  ROS negaitve with exceptions above    Objective   /78 (BP Location: Left arm, Patient Position: Sitting)   Pulse 91   Temp 36.4 °C (97.6 °F) (Temporal)   Wt 69.9 kg (154 lb)   SpO2 99%   BMI 26.43 kg/m²     Physical Exam  Gen: A/O x3 NAD  Eyes: WNL  ENT: Bilat TM dull. Auditory canals wnl. Bilat nares pink/boggy. Oropharynx clear with MMM. No exudate. Uvula midline.   Lungs: Breath sounds CTA bilaterally. No wheeze. Speaks complete sentences/Unlabored.  Heart: RRR, no murmur  Neck: supple, no adenopathy  Skin: warm/dry, no rash  Neuro: grossly intact    Assessment/Plan   Diagnoses and all orders for this visit:  Fluid level behind tympanic membrane of both ears  Acute on chronic fluid behind ear drums  No evidence of infection  Add flonase and antihistamine  See ENT as referred  Follow up if not improving 10-14 days

## 2024-09-09 ENCOUNTER — LAB (OUTPATIENT)
Dept: LAB | Facility: LAB | Age: 59
End: 2024-09-09
Payer: COMMERCIAL

## 2024-09-09 DIAGNOSIS — E55.9 VITAMIN D DEFICIENCY: ICD-10-CM

## 2024-09-09 DIAGNOSIS — Z00.00 ROUTINE MEDICAL EXAM: Primary | ICD-10-CM

## 2024-09-09 DIAGNOSIS — E78.5 HYPERLIPIDEMIA, UNSPECIFIED HYPERLIPIDEMIA TYPE: ICD-10-CM

## 2024-09-09 DIAGNOSIS — Z00.00 ROUTINE MEDICAL EXAM: ICD-10-CM

## 2024-09-09 DIAGNOSIS — E03.8 OTHER SPECIFIED HYPOTHYROIDISM: ICD-10-CM

## 2024-09-09 LAB
25(OH)D3 SERPL-MCNC: 38 NG/ML (ref 30–100)
ALBUMIN SERPL BCP-MCNC: 4.3 G/DL (ref 3.4–5)
ALP SERPL-CCNC: 80 U/L (ref 33–110)
ALT SERPL W P-5'-P-CCNC: 14 U/L (ref 7–45)
ANION GAP SERPL CALC-SCNC: 9 MMOL/L (ref 10–20)
APPEARANCE UR: ABNORMAL
AST SERPL W P-5'-P-CCNC: 17 U/L (ref 9–39)
BASOPHILS # BLD AUTO: 0.03 X10*3/UL (ref 0–0.1)
BASOPHILS NFR BLD AUTO: 0.6 %
BILIRUB SERPL-MCNC: 0.4 MG/DL (ref 0–1.2)
BILIRUB UR STRIP.AUTO-MCNC: NEGATIVE MG/DL
BUN SERPL-MCNC: 17 MG/DL (ref 6–23)
CALCIUM SERPL-MCNC: 9.3 MG/DL (ref 8.6–10.3)
CAOX CRY #/AREA UR COMP ASSIST: ABNORMAL /HPF
CHLORIDE SERPL-SCNC: 105 MMOL/L (ref 98–107)
CHOLEST SERPL-MCNC: 214 MG/DL (ref 0–199)
CHOLESTEROL/HDL RATIO: 4.6
CO2 SERPL-SCNC: 29 MMOL/L (ref 21–32)
COLOR UR: YELLOW
CREAT SERPL-MCNC: 0.76 MG/DL (ref 0.5–1.05)
EGFRCR SERPLBLD CKD-EPI 2021: >90 ML/MIN/1.73M*2
EOSINOPHIL # BLD AUTO: 0.09 X10*3/UL (ref 0–0.7)
EOSINOPHIL NFR BLD AUTO: 1.9 %
ERYTHROCYTE [DISTWIDTH] IN BLOOD BY AUTOMATED COUNT: 12.3 % (ref 11.5–14.5)
GLUCOSE SERPL-MCNC: 91 MG/DL (ref 74–99)
GLUCOSE UR STRIP.AUTO-MCNC: NORMAL MG/DL
HCT VFR BLD AUTO: 38.7 % (ref 36–46)
HDLC SERPL-MCNC: 46.6 MG/DL
HGB BLD-MCNC: 12.8 G/DL (ref 12–16)
IMM GRANULOCYTES # BLD AUTO: 0 X10*3/UL (ref 0–0.7)
IMM GRANULOCYTES NFR BLD AUTO: 0 % (ref 0–0.9)
KETONES UR STRIP.AUTO-MCNC: NEGATIVE MG/DL
LDLC SERPL CALC-MCNC: 149 MG/DL
LEUKOCYTE ESTERASE UR QL STRIP.AUTO: ABNORMAL
LYMPHOCYTES # BLD AUTO: 1.79 X10*3/UL (ref 1.2–4.8)
LYMPHOCYTES NFR BLD AUTO: 36.9 %
MCH RBC QN AUTO: 30.5 PG (ref 26–34)
MCHC RBC AUTO-ENTMCNC: 33.1 G/DL (ref 32–36)
MCV RBC AUTO: 92 FL (ref 80–100)
MONOCYTES # BLD AUTO: 0.56 X10*3/UL (ref 0.1–1)
MONOCYTES NFR BLD AUTO: 11.5 %
MUCOUS THREADS #/AREA URNS AUTO: ABNORMAL /LPF
NEUTROPHILS # BLD AUTO: 2.38 X10*3/UL (ref 1.2–7.7)
NEUTROPHILS NFR BLD AUTO: 49.1 %
NITRITE UR QL STRIP.AUTO: NEGATIVE
NON HDL CHOLESTEROL: 167 MG/DL (ref 0–149)
NRBC BLD-RTO: 0 /100 WBCS (ref 0–0)
PH UR STRIP.AUTO: 6 [PH]
PLATELET # BLD AUTO: 197 X10*3/UL (ref 150–450)
POTASSIUM SERPL-SCNC: 3.8 MMOL/L (ref 3.5–5.3)
PROT SERPL-MCNC: 7 G/DL (ref 6.4–8.2)
PROT UR STRIP.AUTO-MCNC: ABNORMAL MG/DL
RBC # BLD AUTO: 4.2 X10*6/UL (ref 4–5.2)
RBC # UR STRIP.AUTO: NEGATIVE /UL
RBC #/AREA URNS AUTO: ABNORMAL /HPF
SODIUM SERPL-SCNC: 139 MMOL/L (ref 136–145)
SP GR UR STRIP.AUTO: 1.03
TRIGL SERPL-MCNC: 91 MG/DL (ref 0–149)
TSH SERPL-ACNC: 2.12 MIU/L (ref 0.44–3.98)
UROBILINOGEN UR STRIP.AUTO-MCNC: ABNORMAL MG/DL
VLDL: 18 MG/DL (ref 0–40)
WBC # BLD AUTO: 4.9 X10*3/UL (ref 4.4–11.3)
WBC #/AREA URNS AUTO: ABNORMAL /HPF

## 2024-09-09 PROCEDURE — 36415 COLL VENOUS BLD VENIPUNCTURE: CPT

## 2024-09-09 PROCEDURE — 82306 VITAMIN D 25 HYDROXY: CPT

## 2024-09-11 ENCOUNTER — APPOINTMENT (OUTPATIENT)
Dept: PRIMARY CARE | Facility: CLINIC | Age: 59
End: 2024-09-11
Payer: COMMERCIAL

## 2024-09-12 ENCOUNTER — APPOINTMENT (OUTPATIENT)
Dept: PRIMARY CARE | Facility: CLINIC | Age: 59
End: 2024-09-12
Payer: COMMERCIAL

## 2024-09-12 NOTE — PROGRESS NOTES
Subjective   Patient ID: Kalli Quintero is a 58 y.o. female who presents for Annual Exam.    HPI   Kalli is a 59 yo F who presents for CPE  see problem list for history  PMH/FMH/SH reviewed    diet is healthy - eating more veggies and less red meat, cutting back sugars/breads  pt is active, yoga, weight training, walking >1 mile daily  nonsmoker     Previously saw ortho for arthralgias and paresthesias, specifically left great toe pain and hands/fingers  has EMG Dr Uribe  and Dr Lemus  Has seen neuro for sx reevaluation. He had told her to stop MVI due to B levels  saw [facility] Rhe[facility] 1/23 for elevated LAXMI and ASO - did not feel it was autoimmune disorder  taking tumeric and glucosamine chondroitin  pt is in PT and does yoga  working on counseling and relaxation to help tension in her neck due to stressful year  Following the [place] diet at recommendation of GYN     Sees Dr Suazo for dermatology/skin check      Immunizations reviewed      Colonoscopy 2019 repeat 5 years - she will call to schedule  Dr Rodriguez    GYN Dr Nicolas -  Seeing 11/24  was told she is in menopause.  pap 2023  LMP 1/20  Mammogram 11/23  She is interested in [facility] for pelvic floor therapy that is offered by her insurance   Using lubricating gel    Has mirena - will get removed at her next GYN appt     Hx of Chronic ear fullness  Seeing ENT and audiology 10/7/24      Review of Systems  Constitutional Symptoms: negative for fever, loss of appetite, headaches, fatigue.   Eyes: negative for loss and blurring of vision, double vision.   Ear, Nose, Mouth, Throat: negative for hearing loss, tinnitus, nasal congestion, rhinorrhea, nose bleeds, teeth problems, mouth sores, gum disease, dysphagia, sore throat.   Cardiovascular: negative for chest pain/pressure, palpitations, edema, claudication.   Respiratory: negative for shortness of breath, dyspnea on exertion, pain with breathing, coughing.   Breast: negative for tenderness, masses, gynecomastia.  "  Gastrointestinal: negative for anorexia, indigestion, nausea, vomiting, abdominal pain, change in bowel habits, diarrhea, constipation, hematochezia, melena, blood in stool.    : Negative for urinary or genital complaints Positive for urgency   Musculoskeletal: negative for joint pain, joint swelling, myalgia, cramps.   Integumentary: negative for change in mole, skin trouble or rash.   Neurological: negative for headache, numbness, tingling, weakness, tremors.   Psychiatric: negative for depression, anxiety.   Endocrine: negative for weight gain, heat or cold intolerance, polyuria, polydipsia, polyphagia.   Hematologic/Lymphatic: negative for bruising, abnormal bleeding, swollen glands     Objective   /84   Pulse 82   Ht 1.632 m (5' 4.25\")   Wt 68 kg (150 lb)   SpO2 98%   BMI 25.55 kg/m²     Physical Exam  General Appearance: Comfortable. She is well nourished, and well developed. She is awake, alert, and oriented and appears her stated age. The patient is cooperative with exam.  Head: Hair pattern reveals a normal pattern for patient's age and The face shows no abnormalities.  Eyes: PERRLA, EOMI, conjunctiva and sclera clear. Extraocular muscle exam reveals EOMI.  Ears, Nose, Mouth, Throat: Bilateral canals are normal. Both tympanic membranes are pearly gray and landmarks normal.    Nasal mucosa in both nostrils reveals no polyps, ulcerations, or lesions. Oral mucosa reveals no abnormalities and Teeth reveal good repair.  Neck: Neck reveals supple, no adenopathy, no thyromegaly, or carotid bruits.  Chest: Lungs are clear to auscultation bilaterally with no wheezes, rales, or rhonchi.  Cardiovascular: RRR without MRG. No edema  Breast:  gyn  Abdomen: Abdomen with normoactive bowel sounds x4 quads, Abd is soft, NT, ND with no masses.  Genitourinary: GYN  Musculoskeletal: 5/5 and equal strength in bilateral upper and lower extremities.  Skin: Skin reveals good turgor and no rashes.  Neurological: " Intact and non-focal. Cranial nerves II - XII are grossly intact.  Psychiatric: Patient has appropriate judgement. Patient has good insight. Patient's mood is appropriate.       Assessment/Plan   Diagnoses and all orders for this visit:  Routine general medical examination at a health care facility  -     CT cardiac scoring wo IV contrast; Future  59 yo F well exam  Preventative screenings reviewed  Immunizations reviewed  Mediterranean diet, exercise, safety  Blood test and urine results reviewed  Follow up 6 months  Visit for screening mammogram  -     BI mammo bilateral screening tomosynthesis; Future  Mixed hyperlipidemia  -     CT cardiac scoring wo IV contrast; Future  The 10-year ASCVD risk score (Jason CODY, et al., 2019) is: 2.2%    Values used to calculate the score:      Age: 58 years      Sex: Female      Is Non- : No      Diabetic: No      Tobacco smoker: No      Systolic Blood Pressure: 114 mmHg      Is BP treated: No      HDL Cholesterol: 46.6 mg/dL      Total Cholesterol: 214 mg/dL  Cardiovascular risks discussed and, if needed, lifestyle modifications recommended, including nutritional choices, exercise, and elimination of habits contributing to risk.  We agreed on a plan to reduce the current cardiovascular risk.  Aspirin use/tissues was discussed after reviewing updated guidelines  Mediterranean diet, soluble fiber  Recheck 6 months  Other specified hypothyroidism  TSH 2.12  Stable on current levothyroxine medication dose  Vitamin D deficiency  Vitamin D 38  Continue supplementation  Other orders  -     Follow Up In Primary Care - Established; Future

## 2024-09-13 ENCOUNTER — TELEPHONE (OUTPATIENT)
Dept: PRIMARY CARE | Facility: CLINIC | Age: 59
End: 2024-09-13

## 2024-09-13 ENCOUNTER — APPOINTMENT (OUTPATIENT)
Dept: PRIMARY CARE | Facility: CLINIC | Age: 59
End: 2024-09-13
Payer: COMMERCIAL

## 2024-09-13 VITALS
BODY MASS INDEX: 25.61 KG/M2 | HEIGHT: 64 IN | HEART RATE: 82 BPM | WEIGHT: 150 LBS | DIASTOLIC BLOOD PRESSURE: 84 MMHG | SYSTOLIC BLOOD PRESSURE: 114 MMHG | OXYGEN SATURATION: 98 %

## 2024-09-13 DIAGNOSIS — Z12.31 VISIT FOR SCREENING MAMMOGRAM: ICD-10-CM

## 2024-09-13 DIAGNOSIS — Z00.00 ROUTINE GENERAL MEDICAL EXAMINATION AT A HEALTH CARE FACILITY: Primary | ICD-10-CM

## 2024-09-13 DIAGNOSIS — E78.2 MIXED HYPERLIPIDEMIA: ICD-10-CM

## 2024-09-13 DIAGNOSIS — E55.9 VITAMIN D DEFICIENCY: ICD-10-CM

## 2024-09-13 DIAGNOSIS — E03.8 OTHER SPECIFIED HYPOTHYROIDISM: ICD-10-CM

## 2024-09-13 PROCEDURE — 1036F TOBACCO NON-USER: CPT | Performed by: NURSE PRACTITIONER

## 2024-09-13 PROCEDURE — 3008F BODY MASS INDEX DOCD: CPT | Performed by: NURSE PRACTITIONER

## 2024-09-13 PROCEDURE — 99396 PREV VISIT EST AGE 40-64: CPT | Performed by: NURSE PRACTITIONER

## 2024-09-13 ASSESSMENT — PAIN SCALES - GENERAL: PAINLEVEL: 0-NO PAIN

## 2024-09-13 NOTE — PATIENT INSTRUCTIONS
Thank you for choosing our office for your healthcare needs.     Follow the Mediterranean diet - choosing whole foods, vegetables, lean proteins.   It is recommended to get 150 minutes of exercise per week - you can do this in short intervals or 30 minutes 5x per week.     Add soluble fiber to diet

## 2024-10-07 ENCOUNTER — APPOINTMENT (OUTPATIENT)
Dept: AUDIOLOGY | Facility: CLINIC | Age: 59
End: 2024-10-07
Payer: COMMERCIAL

## 2024-10-07 ENCOUNTER — APPOINTMENT (OUTPATIENT)
Dept: OTOLARYNGOLOGY | Facility: CLINIC | Age: 59
End: 2024-10-07
Payer: COMMERCIAL

## 2024-10-07 VITALS — TEMPERATURE: 96.3 F | HEIGHT: 64 IN | BODY MASS INDEX: 34.49 KG/M2 | WEIGHT: 202 LBS

## 2024-10-07 DIAGNOSIS — H93.8X3 EAR FULLNESS, BILATERAL: ICD-10-CM

## 2024-10-07 DIAGNOSIS — H92.02 LEFT EAR PAIN: ICD-10-CM

## 2024-10-07 DIAGNOSIS — H69.93 EUSTACHIAN TUBE DYSFUNCTION, BILATERAL: ICD-10-CM

## 2024-10-07 DIAGNOSIS — H93.293 ABNORMAL AUDITORY PERCEPTION OF BOTH EARS: Primary | ICD-10-CM

## 2024-10-07 DIAGNOSIS — R42 DIZZINESS AND GIDDINESS: Primary | ICD-10-CM

## 2024-10-07 DIAGNOSIS — H69.93 DYSFUNCTION OF BOTH EUSTACHIAN TUBES: ICD-10-CM

## 2024-10-07 PROCEDURE — 99204 OFFICE O/P NEW MOD 45 MIN: CPT | Performed by: OTOLARYNGOLOGY

## 2024-10-07 PROCEDURE — 1036F TOBACCO NON-USER: CPT | Performed by: OTOLARYNGOLOGY

## 2024-10-07 PROCEDURE — 3008F BODY MASS INDEX DOCD: CPT | Performed by: OTOLARYNGOLOGY

## 2024-10-07 PROCEDURE — 92557 COMPREHENSIVE HEARING TEST: CPT | Performed by: AUDIOLOGIST

## 2024-10-07 PROCEDURE — 92550 TYMPANOMETRY & REFLEX THRESH: CPT | Performed by: AUDIOLOGIST

## 2024-10-07 RX ORDER — LEVOCETIRIZINE DIHYDROCHLORIDE 5 MG/1
5 TABLET, FILM COATED ORAL EVERY EVENING
COMMUNITY

## 2024-10-07 NOTE — PROGRESS NOTES
Chief Complaint   Patient presents with    New Patient Visit     SINUS DRAINAGE, INFECTIONS X 1 YEAR RADHA RIOJAS REFERRED HER       Date of Evaluation: 10/7/2024   MADHAV Quintero is a 58 y.o. female seen in consultation at the request of Keshia Riojas for evaluation of ear and neck complaints.  She has had an intermittent history of a woozy feeling in her head sometimes associated with ear pain and neck muscle stiffness.  She tends to carry a lot of tension through her neck and may have a history of cervical disc disease.  She was noted to have a middle ear effusion and was treated with Flonase.  She is feeling better today and her symptom complex is rather vague in a clear story.  There are times when she is looking down that she gets some dizziness.  No spinning when getting in and out of bed.  Her audiogram shows normal hearing and normal tympanometry.  There was some variation related to barometric pressure changes.       Past Medical History:   Diagnosis Date    Anxiety     Diffuse cystic mastopathy 02/04/2008    Hypothyroidism       Past Surgical History:   Procedure Laterality Date    BREAST LUMPECTOMY      DILATION AND CURETTAGE OF UTERUS            Medications:   Current Outpatient Medications   Medication Instructions    bran/gum/fib/gilbert/psyl/kelp/pec (FIBER 6 ORAL) Fiber    calcium carbonate-vitamin D3 600 mg-5 mcg (200 unit) tablet 1 tab(s), Oral, daily, 0 Refill(s), Type: Maintenance    calcium polycarbophiL (Fiber, calcium polycarbophil,) 625 mg tablet 0 Refill(s), Type: Maintenance    cholecalciferol (Vitamin D-3) 25 MCG (1000 UT) tablet ( 25 mcg ), Oral, daily, 0 Refill(s), Type: Maintenance    EFINACONAZOLE TOP topical (top)    escitalopram (LEXAPRO) 5 mg, oral, Daily    famotidine (Pepcid) 10 mg tablet = 1 tab(s) ( 10 mg ), Oral, 0 Refill(s), Type: Maintenance    ferrous gluconate 324 (38 Fe) mg tablet 1 tablet Orally 3 days per week    fluticasone (Flonase Allergy Relief) 50 mcg/actuation  "nasal spray = 2 spray(s), Nasal, Daily, 0 Refill(s), Type: Maintenance    glucosamine-chondroitin 500-400 mg capsule Glucosamine Chondroitin Joint    levocetirizine (XYZAL) 5 mg, oral, Every evening    levonorgestrel (Mirena) 21 mcg/24 hours (8 yrs) 52 mg IUD as directed Intrauterine    levothyroxine (SYNTHROID, LEVOXYL) 75 mcg, oral, Daily before breakfast    meloxicam (Mobic) 15 mg tablet TAKE ONE TABLET BY MOUTH EVERY DAY Oral for 30    olopatadine (Pataday Once Daily Relief) 0.2 % ophthalmic solution 1 drop(s), daily, 0 Refill(s), Type: Maintenance    omega 3-dha-epa-fish oil (Fish OiL) 1,000 mg (120 mg-180 mg) capsule ( 1,200 mg ), Oral, daily, Instructions: 600mg capsules, 0 Refill(s), Type: Maintenance    tretinoin 0.05 % lotion Instructions: APPLY TO AFFECTED AREA ONCE DAILY.        Allergies:  Allergies   Allergen Reactions    Doxycycline Hives and Rash        Physical Exam:  Last Recorded Vitals  Temperature 35.7 °C (96.3 °F), height 1.626 m (5' 4\"), weight 91.6 kg (202 lb).  []General appearance: Well-developed, well-nourished in no acute distress, conversant with normal voice quality    Head/face: No erythema or edema or facial tenderness, and normal facial nerve function bilaterally    External ear: Clear external auditory canals with normal pinnae  Tube status: N/A  Middle ear: Tympanic membranes intact and mobile, middle ears normal.  Tympanic membrane perforation: N/A  Mastoid bowl: N/A  Hearing: Normal conversational awareness at normal speech thresholds    Nose visualized using: Anterior rhinoscopy  Nasal dorsum: Nontraumatic midline appearance  Septum: Midline, nonobstructing  Inferior turbinates: Normal, pink  Secretions: Dry    Oral cavity and oropharynx: Normal  Teeth: Good condition  Floor of mouth: without lesions  Palate: Normal hard palate, soft palate and uvula  Oropharynx: Clear, no lesions present  Buccal mucosa: Normal without masses or lesions  Lips: Normal    Nasopharynx: Inadequate " mirror exam secondary to gag/anatomy    Neck:  Salivary glands: Normal bilateral parotid and submandibular glands by inspection and palpation.  Non-thyroid masses: No palpable masses or significant lymphadenopathy  Trachea: Midline  Thyroid: No thyromegaly or palpable nodules  Temporomandibular joint: Nontender  Cervical range of motion: Normal    Neurologic exam: Alert and oriented x3, appropriate affect.  Cranial nerves II-XII normal bilaterally  Extraocular movement: Extraocular movement intact, normal gaze alignment        Kalli was seen today for new patient visit.  Diagnoses and all orders for this visit:  Dizziness and giddiness (Primary)  Left ear pain  Dysfunction of both eustachian tubes       PLAN  Her symptoms are a bit vague and I believe the differential diagnosis includes cervicogenic dizziness and arthralgia versus BPPV versus eustachian tube dysfunction.  I have encouraged her to continue with Flonase.  Physical therapy for her neck.  If she gets further dizziness she will call me and I would like to check a VNG    Oren Pike MD

## 2024-10-07 NOTE — PROGRESS NOTES
AUDIOLOGY ADULT AUDIOMETRIC EVALUATION    Name:  Kalli Quintero  :  1965  Age:  58 y.o.  Date of Evaluation:  2024    Reason for visit: Ms. Quintero is seen in the clinic today at the request of otolaryngology for an audiologic evaluation.     HISTORY  Patient complains of  feeling muffled and plugged at times  and the need to pop ears.  No dizziness, tinnitus reported and has not worked in noise . No recent ear infections ,perhaps 6 months ago.    EVALUATION  See scanned audiogram: “Media” > “Audiology Report”.      RESULTS  Otoscopic Evaluation:  Right Ear: clear ear canal  Left Ear: clear ear canal    Immittance Measures:  Tympanometry:  Right Ear: Type A, normal tympanic membrane mobility with normal middle ear pressure   Left Ear: Type A, normal tympanic membrane mobility with normal middle ear pressure     Acoustic Reflexes:  Ipsilateral Right Ear:  100 95 100 100   Ipsilateral Left Ear:    95 95 95 95 95  Contralateral Right Ear: did not evaluate  Contralateral Left Ear: did not evaluate    Audiometry:  Test Technique and Reliability:  behavioral  Standard audiometry via supra-aural headphones. Reliability is good.    Pure tone air and bone conduction audiometry:  Right Ear:  Within normal limits 125- 8  K HZ.  Left Ear:     Within normal limits 125- 8 KHZ.    Speech Audiometry (Word Recognition Scores):   Right Ear:  100% excellent  Left Ear:     100% excellent    IMPRESSIONS  Within normal limits in both ears with  and muffled ,popping feeling at times.   The presence of acoustic reflexes within normal intensity limits is consistent with normal middle ear and brainstem function, and suggests that auditory sensitivity is not significantly impaired. An elevated or absent acoustic reflex threshold is consistent with a middle ear disorder, hearing loss in the stimulated ear, and/or interruption of neural innervation of the stapedius muscle. Present DPOAEs suggest normal/near normal cochlear outer  hair cell function and are consistent with no greater than a mild hearing loss at those frequencies. Absent DPOAEs are consistent with abnormal cochlear outer hair cell function and some degree of hearing loss at those frequencies.    RECOMMENDATIONS  - Follow up with otolaryngology today as scheduled.Special tests as needed for muffled sound at times.  - Audiologic evaluation as needed.  - Annual audiologic evaluation, sooner if an acute change is noted.  - Audiologic evaluation in conjunction with otologic care, if an acute change is noted, and/or annually.  - Follow-up with medical care team as planned.    PATIENT EDUCATION  Discussed results, impressions and recommendations with the patient. Questions were addressed and the patient was encouraged to contact our office should concerns arise.    Time for this encounter: 30 minutes.    Gabriel Zacarias  Licensed Audiologist

## 2024-10-29 ENCOUNTER — HOSPITAL ENCOUNTER (OUTPATIENT)
Dept: RADIOLOGY | Facility: HOSPITAL | Age: 59
Discharge: HOME | End: 2024-10-29
Payer: COMMERCIAL

## 2024-10-29 DIAGNOSIS — Z00.00 ROUTINE GENERAL MEDICAL EXAMINATION AT A HEALTH CARE FACILITY: ICD-10-CM

## 2024-10-29 DIAGNOSIS — E78.2 MIXED HYPERLIPIDEMIA: ICD-10-CM

## 2024-10-29 PROCEDURE — 75571 CT HRT W/O DYE W/CA TEST: CPT

## 2024-11-12 NOTE — PROGRESS NOTES
"Annual-menopause  Subjective   Kalli Quintero is a 58 y.o. established patient last seen 11/9/2023 who is here for a gyn exam/ possible removal Mirena iud.    Patient had been using Mirena IUD treatment for heavy menses since 2017 to good effect; prior to that, D&C hysteroscopy had been performed by Dr. Welch in December 2016 which was reported as normal without any discrete intracavitary lesions and benign pathology.  She has read Mirena now approved for up to 8 yrs and would like to keep if ok.    Complaints : denies vag bleed or discharge; denies pelvic  pain, pressure, or persistent bloating. Reports internal vag wall pain at times w sex, despite use of lubricants; current sex interrupted by husb health/stressors.  Last pap 11/9/2023 negative/HPV neg  Last mamm 11/27/2023 neg/heterog density.TC risk 11%   Review of Systems   Constitutional:  Negative for activity change, fatigue and unexpected weight change.   Respiratory:  Negative for chest tightness and shortness of breath.    Cardiovascular:  Negative for chest pain and leg swelling.   Gastrointestinal:  Negative for abdominal distention and abdominal pain.   Genitourinary:  Negative for difficulty urinating, dysuria, genital sores, pelvic pain, vaginal bleeding, vaginal discharge and vaginal pain.   Musculoskeletal:  Negative for gait problem and joint swelling.   Skin:  Negative for color change and rash.   Neurological:  Negative for dizziness, weakness and headaches.   Hematological:  Negative for adenopathy.   Objective Visit Vitals  BP 98/64   Ht 1.626 m (5' 4\")   Wt 66.9 kg (147 lb 6.4 oz)   BMI 25.30 kg/m²   OB Status IUD   Smoking Status Never   BSA 1.74 m²       General:   Alert and oriented, in no acute distress   Neck: Supple. No visible thyromegaly.    Breast/Axilla: Normal to palpation bilaterally without masses, skin changes, or nipple discharge.    Abdomen: Soft, non-tender, without masses or organomegaly   Vulva: Normal architecture without " erythema, masses, or lesions.    Vagina: Normal mucosa without lesions, masses.  No visible blood abnormal vaginal discharge.    Cervix: Healthy appearing pink mucosa ; IUD strings easily visible/no shaft exposed ; 3 to 4 mm round polypoid lesion protruding from external os .  No bleeding or exudate   Uterus: Normal mobile, non-enlarged uterus    Adnexa: No palpable masses or tenderness   Pelvic Floor No POP noted. No high tone pelvic floor    Psych  Rectal Normal affect. Normal mood.      Assessment/Plan   Encounter Diagnoses   Name Primary?    Well female exam with routine gynecological exam; no suspicious findings on breast exam; incidental finding of cervical polyp GYN exam. Yes    Cervical polyp; appearance consistent with typical benign ; will order pelvic ultrasound to check for any other similar lesions within the upper uterine cavity; current size with deep attachment would require LEEP excision; this would potentially disrupt IUD placement.     Surveillance of intrauterine contraceptive device; proper position on current exam; has been using to control perimenopausal bleeding to good effect; wishes to retain a possible.     Menopause; no bleeding for over 1 year; symptoms primarily vulvovaginal/good relief with current regimen.     Encounter for screening mammogram for malignant neoplasm of breast; has order     Postmenopausal bone loss; preventive strategies.     Screen for colon cancer; utd.       Racheal Nicolas MD

## 2024-11-13 ENCOUNTER — OFFICE VISIT (OUTPATIENT)
Dept: OBSTETRICS AND GYNECOLOGY | Facility: CLINIC | Age: 59
End: 2024-11-13
Payer: COMMERCIAL

## 2024-11-13 VITALS
SYSTOLIC BLOOD PRESSURE: 98 MMHG | HEIGHT: 64 IN | WEIGHT: 147.4 LBS | BODY MASS INDEX: 25.16 KG/M2 | DIASTOLIC BLOOD PRESSURE: 64 MMHG

## 2024-11-13 DIAGNOSIS — Z12.31 ENCOUNTER FOR SCREENING MAMMOGRAM FOR MALIGNANT NEOPLASM OF BREAST: ICD-10-CM

## 2024-11-13 DIAGNOSIS — Z01.419 WELL FEMALE EXAM WITH ROUTINE GYNECOLOGICAL EXAM: Primary | ICD-10-CM

## 2024-11-13 DIAGNOSIS — M81.0 POSTMENOPAUSAL BONE LOSS: ICD-10-CM

## 2024-11-13 DIAGNOSIS — Z78.0 MENOPAUSE: ICD-10-CM

## 2024-11-13 DIAGNOSIS — Z12.11 SCREEN FOR COLON CANCER: ICD-10-CM

## 2024-11-13 DIAGNOSIS — Z30.431 SURVEILLANCE OF INTRAUTERINE CONTRACEPTIVE DEVICE: ICD-10-CM

## 2024-11-13 DIAGNOSIS — N84.1 CERVICAL POLYP: ICD-10-CM

## 2024-11-13 PROCEDURE — 99396 PREV VISIT EST AGE 40-64: CPT | Performed by: OBSTETRICS & GYNECOLOGY

## 2024-11-13 PROCEDURE — 3008F BODY MASS INDEX DOCD: CPT | Performed by: OBSTETRICS & GYNECOLOGY

## 2024-11-13 RX ORDER — CHLORHEXIDINE/GLYCERIN/HE-CELL
JELLY (GRAM) TOPICAL ONCE
COMMUNITY

## 2024-11-13 RX ORDER — GLYCERIN/POLYCARBOPHL/CARBOMER
GEL WITH PREFILLED APPLICATOR (GRAM) VAGINAL
COMMUNITY

## 2024-11-13 ASSESSMENT — ENCOUNTER SYMPTOMS
ACTIVITY CHANGE: 0
ABDOMINAL PAIN: 0
CHEST TIGHTNESS: 0
DIFFICULTY URINATING: 0
LOSS OF SENSATION IN FEET: 0
JOINT SWELLING: 0
DIZZINESS: 0
WEAKNESS: 0
COLOR CHANGE: 0
DYSURIA: 0
OCCASIONAL FEELINGS OF UNSTEADINESS: 0
SHORTNESS OF BREATH: 0
ADENOPATHY: 0
HEADACHES: 0
FATIGUE: 0
DEPRESSION: 0
UNEXPECTED WEIGHT CHANGE: 0
ABDOMINAL DISTENTION: 0

## 2024-11-13 ASSESSMENT — LIFESTYLE VARIABLES
SKIP TO QUESTIONS 9-10: 1
HOW OFTEN DO YOU HAVE A DRINK CONTAINING ALCOHOL: MONTHLY OR LESS
HOW MANY STANDARD DRINKS CONTAINING ALCOHOL DO YOU HAVE ON A TYPICAL DAY: 1 OR 2
HOW OFTEN DO YOU HAVE SIX OR MORE DRINKS ON ONE OCCASION: NEVER
AUDIT-C TOTAL SCORE: 1

## 2024-11-13 ASSESSMENT — PATIENT HEALTH QUESTIONNAIRE - PHQ9
SUM OF ALL RESPONSES TO PHQ9 QUESTIONS 1 & 2: 0
2. FEELING DOWN, DEPRESSED OR HOPELESS: NOT AT ALL
1. LITTLE INTEREST OR PLEASURE IN DOING THINGS: NOT AT ALL

## 2024-11-13 ASSESSMENT — PAIN SCALES - GENERAL: PAINLEVEL_OUTOF10: 0-NO PAIN

## 2024-11-14 ENCOUNTER — HOSPITAL ENCOUNTER (OUTPATIENT)
Dept: RADIOLOGY | Facility: HOSPITAL | Age: 59
Discharge: HOME | End: 2024-11-14
Payer: COMMERCIAL

## 2024-11-14 DIAGNOSIS — N84.1 CERVICAL POLYP: ICD-10-CM

## 2024-11-14 PROCEDURE — 76856 US EXAM PELVIC COMPLETE: CPT

## 2024-11-29 ENCOUNTER — HOSPITAL ENCOUNTER (OUTPATIENT)
Dept: RADIOLOGY | Facility: HOSPITAL | Age: 59
Discharge: HOME | End: 2024-11-29
Payer: COMMERCIAL

## 2024-11-29 VITALS — WEIGHT: 147.4 LBS | BODY MASS INDEX: 25.16 KG/M2 | HEIGHT: 64 IN

## 2024-11-29 DIAGNOSIS — Z12.31 VISIT FOR SCREENING MAMMOGRAM: ICD-10-CM

## 2024-11-29 PROCEDURE — 77063 BREAST TOMOSYNTHESIS BI: CPT

## 2024-12-02 ENCOUNTER — APPOINTMENT (OUTPATIENT)
Dept: PRIMARY CARE | Facility: CLINIC | Age: 59
End: 2024-12-02
Payer: COMMERCIAL

## 2024-12-02 VITALS
DIASTOLIC BLOOD PRESSURE: 80 MMHG | HEIGHT: 64 IN | SYSTOLIC BLOOD PRESSURE: 130 MMHG | HEART RATE: 85 BPM | WEIGHT: 146 LBS | BODY MASS INDEX: 24.92 KG/M2 | OXYGEN SATURATION: 98 %

## 2024-12-02 DIAGNOSIS — M25.512 ACUTE PAIN OF LEFT SHOULDER: Primary | ICD-10-CM

## 2024-12-02 DIAGNOSIS — H93.8X3 SENSATION OF FULLNESS IN BOTH EARS: ICD-10-CM

## 2024-12-02 DIAGNOSIS — R31.9 HEMATURIA, UNSPECIFIED TYPE: ICD-10-CM

## 2024-12-02 PROBLEM — M25.50 ARTHRALGIA: Status: RESOLVED | Noted: 2021-03-10 | Resolved: 2024-12-02

## 2024-12-02 PROBLEM — M65.312 TRIGGER THUMB, LEFT THUMB: Status: RESOLVED | Noted: 2022-11-08 | Resolved: 2024-12-02

## 2024-12-02 PROBLEM — R25.3 MUSCLE TWITCHING: Status: RESOLVED | Noted: 2021-03-10 | Resolved: 2024-12-02

## 2024-12-02 PROBLEM — E55.9 VITAMIN D DEFICIENCY: Status: RESOLVED | Noted: 2021-03-10 | Resolved: 2024-12-02

## 2024-12-02 PROBLEM — Z12.12 SCREENING FOR COLORECTAL CANCER: Status: RESOLVED | Noted: 2023-11-09 | Resolved: 2024-12-02

## 2024-12-02 PROBLEM — N89.4: Status: RESOLVED | Noted: 2023-08-29 | Resolved: 2024-12-02

## 2024-12-02 PROBLEM — F41.9 ANXIETY DISORDER, UNSPECIFIED: Status: RESOLVED | Noted: 2018-09-18 | Resolved: 2024-12-02

## 2024-12-02 PROBLEM — R20.2 PARESTHESIAS: Status: RESOLVED | Noted: 2023-01-20 | Resolved: 2024-12-02

## 2024-12-02 PROBLEM — J32.9 SINUSITIS: Status: RESOLVED | Noted: 2023-02-28 | Resolved: 2024-12-02

## 2024-12-02 PROBLEM — N76.1 CHRONIC VAGINITIS: Status: RESOLVED | Noted: 2023-08-29 | Resolved: 2024-12-02

## 2024-12-02 PROBLEM — R00.2 PALPITATION: Status: RESOLVED | Noted: 2021-09-27 | Resolved: 2024-12-02

## 2024-12-02 PROBLEM — N92.6 IRREGULAR BLEEDING: Status: RESOLVED | Noted: 2023-08-29 | Resolved: 2024-12-02

## 2024-12-02 PROBLEM — B96.89 BACTERIAL VAGINOSIS: Status: RESOLVED | Noted: 2019-04-30 | Resolved: 2024-12-02

## 2024-12-02 PROBLEM — H66.92 LEFT OTITIS MEDIA: Status: RESOLVED | Noted: 2023-07-08 | Resolved: 2024-12-02

## 2024-12-02 PROBLEM — N95.1 FEMALE CLIMACTERIC STATE: Status: RESOLVED | Noted: 2023-08-29 | Resolved: 2024-12-02

## 2024-12-02 PROBLEM — S46.819A STRAIN OF TRAPEZIUS MUSCLE: Status: RESOLVED | Noted: 2021-03-10 | Resolved: 2024-12-02

## 2024-12-02 PROBLEM — N95.1 PERIMENOPAUSE: Status: RESOLVED | Noted: 2023-08-29 | Resolved: 2024-12-02

## 2024-12-02 PROBLEM — Z12.11 SCREENING FOR COLORECTAL CANCER: Status: RESOLVED | Noted: 2023-11-09 | Resolved: 2024-12-02

## 2024-12-02 PROBLEM — D25.9 LEIOMYOMA OF UTERUS, UNSPECIFIED: Status: RESOLVED | Noted: 2018-09-18 | Resolved: 2024-12-02

## 2024-12-02 PROBLEM — N76.0 BACTERIAL VAGINOSIS: Status: RESOLVED | Noted: 2019-04-30 | Resolved: 2024-12-02

## 2024-12-02 LAB
POC APPEARANCE, URINE: CLEAR
POC BILIRUBIN, URINE: NEGATIVE
POC BLOOD, URINE: ABNORMAL
POC COLOR, URINE: YELLOW
POC GLUCOSE, URINE: NEGATIVE MG/DL
POC KETONES, URINE: NEGATIVE MG/DL
POC LEUKOCYTES, URINE: NEGATIVE
POC NITRITE,URINE: NEGATIVE
POC PH, URINE: 5.5 PH
POC PROTEIN, URINE: NEGATIVE MG/DL
POC SPECIFIC GRAVITY, URINE: 1.01
POC UROBILINOGEN, URINE: 0.2 EU/DL

## 2024-12-02 PROCEDURE — 3008F BODY MASS INDEX DOCD: CPT | Performed by: NURSE PRACTITIONER

## 2024-12-02 PROCEDURE — 99214 OFFICE O/P EST MOD 30 MIN: CPT | Performed by: NURSE PRACTITIONER

## 2024-12-02 PROCEDURE — 87086 URINE CULTURE/COLONY COUNT: CPT

## 2024-12-02 PROCEDURE — 81003 URINALYSIS AUTO W/O SCOPE: CPT | Performed by: NURSE PRACTITIONER

## 2024-12-02 RX ORDER — NITROFURANTOIN 25; 75 MG/1; MG/1
100 CAPSULE ORAL 2 TIMES DAILY
Qty: 10 CAPSULE | Refills: 0 | Status: SHIPPED | OUTPATIENT
Start: 2024-12-02 | End: 2024-12-07

## 2024-12-02 ASSESSMENT — ENCOUNTER SYMPTOMS
VOMITING: 0
ARTHRALGIAS: 1
RHINORRHEA: 0
BACK PAIN: 0
CARDIOVASCULAR NEGATIVE: 1
CHILLS: 0
BLOOD IN STOOL: 0
FEVER: 0
CONSTIPATION: 0
ABDOMINAL PAIN: 1
NAUSEA: 0
DIARRHEA: 0
SINUS PRESSURE: 0
SINUS PAIN: 0
RESPIRATORY NEGATIVE: 1
HEMATURIA: 1

## 2024-12-02 NOTE — PROGRESS NOTES
"Subjective   Patient ID: Kalli Quintero is a 58 y.o. female who presents for Blood in Urine.    HPI   Kalli is a 57 yo F who presents for blood in urine on/off for past 4 days  Pink tinged to urine - began on Friday  Denies blood on toilet tissue  Has cervical polyp for which she is seeing GYN    Has mild left side and left shoulder pain that began Saturday after slipping and shoveling snow    No hx of kidney stones  Has not been drinking much water of late    Review of Systems   Constitutional:  Negative for chills and fever.   HENT:  Positive for congestion and ear pain. Negative for postnasal drip, rhinorrhea, sinus pressure and sinus pain.    Respiratory: Negative.     Cardiovascular: Negative.    Gastrointestinal:  Positive for abdominal pain (left side pain). Negative for blood in stool, constipation, diarrhea, nausea and vomiting.   Genitourinary:  Positive for hematuria. Negative for vaginal bleeding, vaginal discharge and vaginal pain.   Musculoskeletal:  Positive for arthralgias (left shoulder). Negative for back pain.   Skin:  Negative for rash.     Objective   /80   Pulse 85   Ht 1.626 m (5' 4\")   Wt 66.2 kg (146 lb)   SpO2 98%   BMI 25.06 kg/m²     Physical Exam  Vitals and nursing note reviewed.   Constitutional:       General: She is not in acute distress.     Appearance: Normal appearance.   HENT:      Head: Normocephalic and atraumatic.      Right Ear: Tympanic membrane normal.      Left Ear: Tympanic membrane normal.   Cardiovascular:      Rate and Rhythm: Normal rate and regular rhythm.   Pulmonary:      Effort: Pulmonary effort is normal. No respiratory distress.      Breath sounds: Normal breath sounds.   Abdominal:      General: Bowel sounds are normal. There is no distension.      Palpations: Abdomen is soft. There is no mass.      Tenderness: There is no abdominal tenderness. There is no right CVA tenderness, left CVA tenderness, guarding or rebound.   Musculoskeletal:         " General: No deformity. Normal range of motion.      Cervical back: Normal range of motion and neck supple.      Comments: Negative empty can left   Skin:     General: Skin is warm and dry.   Neurological:      General: No focal deficit present.      Mental Status: She is alert.   Psychiatric:         Mood and Affect: Mood normal.         Assessment/Plan   Diagnoses and all orders for this visit:  Hematuria, unspecified type  -     POCT UA Automated manually resulted  -     Urine Culture  -     nitrofurantoin, macrocrystal-monohydrate, (Macrobid) 100 mg capsule; Take 1 capsule (100 mg) by mouth 2 times a day for 5 days.  Needs better control  Differentials include but not limited to: UTI, renal stone, interstitial cystitis, vaginal origin  Add urine cx  Cover with antibiotic pending result  If not improving, sx worsening or negaitve culture, consider CT to r/o renal stone  Fluids, rest, avoid bladder irritants, practice bladder hygiene  Follow up 2-4 weeks INB    Left shoulder pain  Likely from shoveling snow  Otc as directed  Ice, topicals  Follow up prn    Ear Fullness  Acute on chronic  No evidence of OM  Otc as directed

## 2024-12-04 LAB — BACTERIA UR CULT: NO GROWTH

## 2024-12-05 ENCOUNTER — TELEPHONE (OUTPATIENT)
Dept: OBSTETRICS AND GYNECOLOGY | Facility: CLINIC | Age: 59
End: 2024-12-05
Payer: COMMERCIAL

## 2024-12-05 DIAGNOSIS — R31.9 HEMATURIA, UNSPECIFIED TYPE: Primary | ICD-10-CM

## 2024-12-05 NOTE — TELEPHONE ENCOUNTER
Est pt last seen 11/13/2024 Annual / Pt calling states that she had blood in urine 11/29 and 11/30 / pt seen pcp CELESTE Mcnair CNP / urine culture was neg / Pt states that pcp  advised to see gyn to see if blood coming from cx polyp / if not from polyp PCP will order CT to r/o kidney stones / Pt appt  sched for cx polyp check 12/13/2024 ( pt is sched for polyp removal 1/16/2025 ) msg to Dr Nicolas

## 2024-12-11 NOTE — PROGRESS NOTES
"Here for polyp exam for pmb/hematuria seen as pink colored urine x 2 days=11/27 and 11/28; NO associated pain; PCOT pos for SMALL  blood at PCP office subseq. No further episodes since.  Visit Vitals  /63   Ht 1.626 m (5' 4\")   Wt 67.1 kg (148 lb)   BMI 25.40 kg/m²   OB Status IUD   Smoking Status Never   BSA 1.74 m²   Constitutional/general: Well developed/Well nourished  Neuro/psych: Oriented x 3; Mood/affect--pleasant  Gyn:   External genitalia: No visible abnormality  Urethra: No visible abrasions or caruncle  Vagina: Pink mucosa; no blood or exudate or lesions at present  Cervix: Pea-sized polyp with deep stalk persists; does NOT bleed with contact; IUD strings visible  Uterus: Grossly normal  Adnexa: Grossly normal  Anus/perineum: Grossly normal  Encounter Diagnoses   Name Primary?    Hematuria of undiagnosed cause; 2-day history blood-tinged urine now with resolved; no associated pain; current GYN exam negative.  Detailed review of activities would suggest probable mucosal irritation.  Patient recently began home pelvic floor therapy using Bloom device. Yes    Postmenopausal atrophic vaginitis; no visible mucosal injury seen today.  Continue current regimen     Surveillance of intrauterine contraceptive device; proper position on exam today     Cervical polyp; not able to demonstrate any friability on exam, but could spot intermittently if contact with IUD string or intravaginal devices.     Dyspareunia, female; currently treating with topical estrogen and pelvic floor therapy.       Racheal Nicolas MD  "

## 2024-12-13 ENCOUNTER — OFFICE VISIT (OUTPATIENT)
Dept: OBSTETRICS AND GYNECOLOGY | Facility: CLINIC | Age: 59
End: 2024-12-13
Payer: COMMERCIAL

## 2024-12-13 VITALS
SYSTOLIC BLOOD PRESSURE: 114 MMHG | HEIGHT: 64 IN | DIASTOLIC BLOOD PRESSURE: 63 MMHG | WEIGHT: 148 LBS | BODY MASS INDEX: 25.27 KG/M2

## 2024-12-13 DIAGNOSIS — R31.9 HEMATURIA OF UNDIAGNOSED CAUSE: Primary | ICD-10-CM

## 2024-12-13 DIAGNOSIS — N84.1 CERVICAL POLYP: ICD-10-CM

## 2024-12-13 DIAGNOSIS — Z30.431 SURVEILLANCE OF INTRAUTERINE CONTRACEPTIVE DEVICE: ICD-10-CM

## 2024-12-13 DIAGNOSIS — N95.2 POSTMENOPAUSAL ATROPHIC VAGINITIS: ICD-10-CM

## 2024-12-13 DIAGNOSIS — N94.10 DYSPAREUNIA, FEMALE: ICD-10-CM

## 2024-12-13 PROCEDURE — 99213 OFFICE O/P EST LOW 20 MIN: CPT | Performed by: OBSTETRICS & GYNECOLOGY

## 2024-12-13 PROCEDURE — 1036F TOBACCO NON-USER: CPT | Performed by: OBSTETRICS & GYNECOLOGY

## 2024-12-13 PROCEDURE — 3008F BODY MASS INDEX DOCD: CPT | Performed by: OBSTETRICS & GYNECOLOGY

## 2024-12-13 ASSESSMENT — LIFESTYLE VARIABLES
SKIP TO QUESTIONS 9-10: 1
AUDIT-C TOTAL SCORE: 1
HOW MANY STANDARD DRINKS CONTAINING ALCOHOL DO YOU HAVE ON A TYPICAL DAY: 1 OR 2
HOW OFTEN DO YOU HAVE SIX OR MORE DRINKS ON ONE OCCASION: NEVER
HOW OFTEN DO YOU HAVE A DRINK CONTAINING ALCOHOL: MONTHLY OR LESS

## 2024-12-13 ASSESSMENT — PATIENT HEALTH QUESTIONNAIRE - PHQ9
2. FEELING DOWN, DEPRESSED OR HOPELESS: NOT AT ALL
1. LITTLE INTEREST OR PLEASURE IN DOING THINGS: NOT AT ALL
SUM OF ALL RESPONSES TO PHQ9 QUESTIONS 1 & 2: 0

## 2024-12-13 ASSESSMENT — ENCOUNTER SYMPTOMS
LOSS OF SENSATION IN FEET: 0
OCCASIONAL FEELINGS OF UNSTEADINESS: 0
DEPRESSION: 0

## 2024-12-13 ASSESSMENT — PAIN SCALES - GENERAL: PAINLEVEL_OUTOF10: 0-NO PAIN

## 2024-12-23 ENCOUNTER — LAB (OUTPATIENT)
Dept: LAB | Facility: LAB | Age: 59
End: 2024-12-23
Payer: COMMERCIAL

## 2024-12-23 DIAGNOSIS — R31.9 HEMATURIA, UNSPECIFIED TYPE: ICD-10-CM

## 2024-12-23 LAB
APPEARANCE UR: CLEAR
BILIRUB UR STRIP.AUTO-MCNC: NEGATIVE MG/DL
COLOR UR: ABNORMAL
GLUCOSE UR STRIP.AUTO-MCNC: NORMAL MG/DL
KETONES UR STRIP.AUTO-MCNC: NEGATIVE MG/DL
LEUKOCYTE ESTERASE UR QL STRIP.AUTO: NEGATIVE
MUCOUS THREADS #/AREA URNS AUTO: ABNORMAL /LPF
NITRITE UR QL STRIP.AUTO: NEGATIVE
PH UR STRIP.AUTO: 7.5 [PH]
PROT UR STRIP.AUTO-MCNC: NEGATIVE MG/DL
RBC # UR STRIP.AUTO: ABNORMAL /UL
RBC #/AREA URNS AUTO: ABNORMAL /HPF
SP GR UR STRIP.AUTO: 1.01
UROBILINOGEN UR STRIP.AUTO-MCNC: NORMAL MG/DL
WBC #/AREA URNS AUTO: ABNORMAL /HPF

## 2024-12-31 ENCOUNTER — OFFICE VISIT (OUTPATIENT)
Dept: PRIMARY CARE | Facility: CLINIC | Age: 59
End: 2024-12-31
Payer: COMMERCIAL

## 2024-12-31 VITALS
TEMPERATURE: 98 F | SYSTOLIC BLOOD PRESSURE: 116 MMHG | DIASTOLIC BLOOD PRESSURE: 72 MMHG | WEIGHT: 148 LBS | BODY MASS INDEX: 25.4 KG/M2 | HEART RATE: 90 BPM | OXYGEN SATURATION: 97 %

## 2024-12-31 DIAGNOSIS — R31.0 GROSS HEMATURIA: Primary | ICD-10-CM

## 2024-12-31 DIAGNOSIS — R39.9 UTI SYMPTOMS: ICD-10-CM

## 2024-12-31 LAB
POC APPEARANCE, URINE: CLEAR
POC BILIRUBIN, URINE: NEGATIVE
POC BLOOD, URINE: NEGATIVE
POC COLOR, URINE: YELLOW
POC GLUCOSE, URINE: NEGATIVE MG/DL
POC KETONES, URINE: NEGATIVE MG/DL
POC LEUKOCYTES, URINE: NEGATIVE
POC NITRITE,URINE: NEGATIVE
POC PH, URINE: 8.5 PH
POC PROTEIN, URINE: NEGATIVE MG/DL
POC SPECIFIC GRAVITY, URINE: 1.02
POC UROBILINOGEN, URINE: 0.2 EU/DL

## 2024-12-31 PROCEDURE — 1036F TOBACCO NON-USER: CPT | Performed by: STUDENT IN AN ORGANIZED HEALTH CARE EDUCATION/TRAINING PROGRAM

## 2024-12-31 PROCEDURE — 99213 OFFICE O/P EST LOW 20 MIN: CPT | Performed by: STUDENT IN AN ORGANIZED HEALTH CARE EDUCATION/TRAINING PROGRAM

## 2024-12-31 PROCEDURE — 81003 URINALYSIS AUTO W/O SCOPE: CPT | Performed by: STUDENT IN AN ORGANIZED HEALTH CARE EDUCATION/TRAINING PROGRAM

## 2024-12-31 ASSESSMENT — ENCOUNTER SYMPTOMS
WHEEZING: 0
FREQUENCY: 0
COUGH: 0
DIZZINESS: 0
MYALGIAS: 0
FEVER: 0
SLEEP DISTURBANCE: 0
DIARRHEA: 0
CONSTIPATION: 0
POLYDIPSIA: 0
WOUND: 0
FLANK PAIN: 1
RHINORRHEA: 0
FATIGUE: 0
NERVOUS/ANXIOUS: 0
SHORTNESS OF BREATH: 0
NAUSEA: 0
LIGHT-HEADEDNESS: 0
HEADACHES: 0
VOMITING: 0
DYSPHORIC MOOD: 0
HEMATURIA: 1
ARTHRALGIAS: 0
DYSURIA: 0
PALPITATIONS: 0
SINUS PRESSURE: 0
CHILLS: 0
SINUS PAIN: 0

## 2024-12-31 ASSESSMENT — PAIN SCALES - GENERAL: PAINLEVEL_OUTOF10: 0-NO PAIN

## 2024-12-31 NOTE — PROGRESS NOTES
Subjective   Patient ID: Kalli Quintero is a 59 y.o. female who presents for Abdominal Pain (Pain on left side x pt states has been going on few weeks but has gotten worse) and Follow-up (Blood in urine, saw [facility] 12/2/24).    Patient here to follow-up regarding flank pain and hematuria.  She had previously been evaluated in this practice by another provider.  No cause had been found yet for the symptoms.  Patient continues to have intermittent flank pain.  Also continues to have intermittent red-tinged urine.  Has not noticed any vaginal bleeding or spotting.  Has been seen by OB/GYN since this started with a noncontributory ultrasound.        Review of Systems   Constitutional:  Negative for chills, fatigue and fever.   HENT:  Negative for congestion, hearing loss, rhinorrhea, sinus pressure, sinus pain and tinnitus.    Eyes:  Negative for visual disturbance.   Respiratory:  Negative for cough, shortness of breath and wheezing.    Cardiovascular:  Negative for chest pain, palpitations and leg swelling.   Gastrointestinal:  Negative for constipation, diarrhea, nausea and vomiting.   Endocrine: Negative for cold intolerance, heat intolerance, polydipsia and polyuria.   Genitourinary:  Positive for flank pain and hematuria. Negative for dysuria, frequency, menstrual problem, urgency and vaginal discharge.   Musculoskeletal:  Negative for arthralgias and myalgias.   Skin:  Negative for pallor, rash and wound.   Neurological:  Negative for dizziness, light-headedness and headaches.   Psychiatric/Behavioral:  Negative for dysphoric mood and sleep disturbance. The patient is not nervous/anxious.        Objective     Visit Vitals  /72   Pulse 90   Temp 36.7 °C (98 °F)   Wt 67.1 kg (148 lb)   SpO2 97%   BMI 25.40 kg/m²   OB Status IUD   Smoking Status Never   BSA 1.74 m²         Physical Exam  Constitutional:       Appearance: Normal appearance.   HENT:      Head: Normocephalic and atraumatic.      Right Ear: Tympanic  membrane, ear canal and external ear normal.      Left Ear: Tympanic membrane, ear canal and external ear normal.      Nose: Nose normal.      Mouth/Throat:      Mouth: Mucous membranes are moist.      Pharynx: Oropharynx is clear.   Eyes:      Extraocular Movements: Extraocular movements intact.      Conjunctiva/sclera: Conjunctivae normal.      Pupils: Pupils are equal, round, and reactive to light.   Cardiovascular:      Rate and Rhythm: Normal rate and regular rhythm.      Pulses: Normal pulses.      Heart sounds: Normal heart sounds.   Pulmonary:      Effort: Pulmonary effort is normal.      Breath sounds: Normal breath sounds.   Abdominal:      General: There is no distension.      Palpations: Abdomen is soft.      Tenderness: There is no abdominal tenderness.   Musculoskeletal:         General: Normal range of motion.      Cervical back: Normal range of motion.   Lymphadenopathy:      Cervical: No cervical adenopathy.   Skin:     General: Skin is warm and dry.   Neurological:      Mental Status: She is alert and oriented to person, place, and time. Mental status is at baseline.   Psychiatric:         Mood and Affect: Mood normal.         Behavior: Behavior normal.         Assessment & Plan  UTI symptoms    Orders:    POCT UA Automated manually resulted  Unrevealing at this time.  Did not send for microscopy today given serial microscopic UAs showing blood.  Gross hematuria    Orders:    US renal complete; Future    Renal function panel; Future  Review of patient's chart and labs ordered by previous PCP shows that there has been microscopic hematuria present since around September 2024.    Based on presentation and symptoms, would favor possible renal calculus.  Will obtain US of kidneys.  If there are small stones, would recommend tamsulosin, or would send for urology consult if large stones.  If US is unrevealing, will obtain CT and request evaluation with urology.     Differential considerations could also  include various forms of glomerulonephritis, or renal mass.  A bladder malignancy would be very low on the differential as patient has never smoked.       Reviewed and approved by HUMZA WIGGINS on 12/31/24 at 12:25 PM.

## 2025-01-03 ENCOUNTER — LAB (OUTPATIENT)
Dept: LAB | Facility: LAB | Age: 60
End: 2025-01-03
Payer: COMMERCIAL

## 2025-01-03 ENCOUNTER — HOSPITAL ENCOUNTER (OUTPATIENT)
Dept: RADIOLOGY | Facility: HOSPITAL | Age: 60
Discharge: HOME | End: 2025-01-03
Payer: COMMERCIAL

## 2025-01-03 DIAGNOSIS — R31.0 GROSS HEMATURIA: ICD-10-CM

## 2025-01-03 LAB
ALBUMIN SERPL BCP-MCNC: 4.2 G/DL (ref 3.4–5)
ANION GAP SERPL CALC-SCNC: 13 MMOL/L (ref 10–20)
BUN SERPL-MCNC: 13 MG/DL (ref 6–23)
CALCIUM SERPL-MCNC: 9.4 MG/DL (ref 8.6–10.3)
CHLORIDE SERPL-SCNC: 103 MMOL/L (ref 98–107)
CO2 SERPL-SCNC: 27 MMOL/L (ref 21–32)
CREAT SERPL-MCNC: 0.73 MG/DL (ref 0.5–1.05)
EGFRCR SERPLBLD CKD-EPI 2021: >90 ML/MIN/1.73M*2
GLUCOSE SERPL-MCNC: 93 MG/DL (ref 74–99)
PHOSPHATE SERPL-MCNC: 3.7 MG/DL (ref 2.5–4.9)
POTASSIUM SERPL-SCNC: 3.7 MMOL/L (ref 3.5–5.3)
SODIUM SERPL-SCNC: 139 MMOL/L (ref 136–145)

## 2025-01-03 PROCEDURE — 76770 US EXAM ABDO BACK WALL COMP: CPT

## 2025-01-03 PROCEDURE — 80069 RENAL FUNCTION PANEL: CPT

## 2025-01-11 NOTE — PROGRESS NOTES
Kalli Quintero comes in for gyn concern of incidental finding of cervical polyp with thick attachment within the endocervical canal /here for LEEP excision .    There were no vitals taken for this visit.   Constitutional/general:Well developed...Well nourished...Habitus..  Neuro/psych: Oriented x 3;Mood/affect  Gyn:   External genitalia:  Urethra:  Vagina:  Pelvic support:  Cervix:  Uterus:  Adnexa:  Anus/perineum:

## 2025-01-16 ENCOUNTER — APPOINTMENT (OUTPATIENT)
Dept: OBSTETRICS AND GYNECOLOGY | Facility: CLINIC | Age: 60
End: 2025-01-16
Payer: COMMERCIAL

## 2025-01-16 ENCOUNTER — TELEPHONE (OUTPATIENT)
Dept: OBSTETRICS AND GYNECOLOGY | Facility: CLINIC | Age: 60
End: 2025-01-16
Payer: COMMERCIAL

## 2025-01-16 NOTE — TELEPHONE ENCOUNTER
Attempted to reach pt to offer appt tomorrow with Dr Torres ( ok per Joann) [facility]

## 2025-01-16 NOTE — PROGRESS NOTES
GYNECOLOGY OFFICE VISIT   Subjective     History Of Present Illness:    Kalli Quintero is a 59 y.o.  presenting for vaginal issues.     She is concerned about left sided vaginal pain that started on 25. She describes the pain as an aching pain, localized to the left, 2-3/10 in intensity. She was recently diagnosed with kidney stones and started on Flomax and thinks this vaginal pain started at around the same time. She has tried Meloxicam which provided some relief. She does also endorse a new vaginal bump on the left side, mobile, she thinks it is about 1cm in size, denies any drainage or bleeding from this area.     Notably, she is a patient of Dr. Nicolas and is scheduled for a polyp removal/LEEP with her on 25.    Review of Systems:  Denies abdominal pain, pelvic pain, nausea, vomiting, urinary frequency, dysuria, hematuria, constipation, hematochezia, abnormal bleeding, abnormal vaginal discharge, or dyspareunia.  She is endorsing some discomfort with urination.     Menstrual History:  OB History          2    Para   2    Term   2       0    AB   0    Living   2         SAB   0    IAB   0    Ectopic   0    Multiple   0    Live Births   2               No LMP recorded. (Menstrual status: IUD).     Last Pap Smear:  Result Date Procedure Results Follow-ups Next Due   2023 THINPREP PAP NILM, HR HPV neg       Last Mammogram:  24  BI mammo bilateral screening tomosynthesis  Impression  No mammographic evidence of malignancy.  BI-RADS CATEGORY:  BI-RADS Category:  1 Negative.  Recommendation:  Annual Screening.  Recommended Date:  1 Year.  Laterality:  Bilateral.    Last Colonoscopy:  2024- normal, repeat in 10 years    Past Medical History:  Past Medical History:   Diagnosis Date    Anxiety     Diffuse cystic mastopathy 2008    Hypothyroidism      Past Surgical History:  Past Surgical History:   Procedure Laterality Date    DILATION AND CURETTAGE OF UTERUS       Social  History:  Social History     Tobacco Use    Smoking status: Never    Smokeless tobacco: Never   Substance Use Topics    Alcohol use: Yes     Alcohol/week: 2.0 standard drinks of alcohol     Types: 2 Glasses of wine per week     Family History:  Family History   Problem Relation Name Age of Onset    Alzheimer's disease Mother      Prostate cancer Father      Heart disease Father      Cancer Maternal Grandmother      Heart disease Paternal Grandmother       Allergies:  Doxycycline    Outpatient Medications:  Current Outpatient Medications   Medication Instructions    calcium carbonate-vitamin D3 600 mg-5 mcg (200 unit) tablet 1 tab(s), Oral, daily, 0 Refill(s), Type: Maintenance    calcium polycarbophiL (Fiber, calcium polycarbophil,) 625 mg tablet 0 Refill(s), Type: Maintenance    cholecalciferol (Vitamin D-3) 25 MCG (1000 UT) tablet ( 25 mcg ), Oral, daily, 0 Refill(s), Type: Maintenance    EFINACONAZOLE TOP Apply topically.    escitalopram (Lexapro) 10 mg tablet oral, Daily    famotidine (Pepcid) 10 mg tablet = 1 tab(s) ( 10 mg ), Oral, 0 Refill(s), Type: Maintenance    ferrous gluconate 324 (38 Fe) mg tablet 1 tablet Orally 3 days per week    fluticasone (Flonase Allergy Relief) 50 mcg/actuation nasal spray = 2 spray(s), Nasal, Daily, 0 Refill(s), Type: Maintenance    glucosamine-chondroitin 500-400 mg capsule Glucosamine Chondroitin Joint    glycerin-polycarbophl-carbomer (RepHresh) gel Insert into the vagina.    levocetirizine (XYZAL) 5 mg, Every evening    levonorgestrel (Mirena) 21 mcg/24 hours (8 yrs) 52 mg IUD as directed Intrauterine    levothyroxine (SYNTHROID, LEVOXYL) 75 mcg, oral, Daily before breakfast    lubricating jelly (Personal Lubricating Jelly) gel Once    meloxicam (Mobic) 15 mg tablet TAKE ONE TABLET BY MOUTH EVERY DAY Oral for 30    olopatadine (Pataday Once Daily Relief) 0.2 % ophthalmic solution 1 drop(s), daily, 0 Refill(s), Type: Maintenance    omega 3-dha-epa-fish oil (Fish OiL) 1,000 mg  (120 mg-180 mg) capsule ( 1,200 mg ), Oral, daily, Instructions: 600mg capsules, 0 Refill(s), Type: Maintenance    tamsulosin (FLOMAX) 0.4 mg, oral, Daily    tretinoin 0.05 % lotion Instructions: APPLY TO AFFECTED AREA ONCE DAILY.       Objective   Last Recorded Vitals:  Visit Vitals  /78     Physical Exam:  General: Alert and oriented, in no acute distress.  Psych: Normal affect and mood. Able to answer questions appropriately.   Heart: Regular rate.  Lungs: Non labored respirations.  GYN:  Speculum:  Vulva: Normal architecture without erythema, masses, or lesions.   Vagina: Normal moist mucosa without lesions, masses, or atrophy. No abnormal vaginal discharge or blood in the vaginal vault.   Cervix: Small, approximately 0.5-1cm polyp seen at the cervical os. IUD strings visualized.  Bimanual:   Cervix: No cervical motion tenderness.  Vagina: No palpable bumps, masses, or areas of tenderness.  Uterus: Normal, mobile, non-enlarged, non tender uterus.  Adnexa: Normal without tenderness, nodularity, masses, or lesions.    Assessment/Plan     59 y.o.    Assessment & Plan  Vaginal lesion  -Overall unremarkable exam today except for the known polyp  -Findings reviewed with the patient and reassurance provided  -Discussed her discomfort may be referred pain from recent kidney stone vs UTI  -Discussed Tylenol, Motrin for symptom relief as needed and finishing course of Flomax  -Advised to notify us with any worsening in symptoms otherwise follow up for polyp removal on 25       Dysuria  -Patient with discomfort with urination  -Udip in office showing +blood and leukocyte esterase  -Urine culture pending and will treat as indicated  Orders:    POCT UA Automated manually resulted    Urine culture    Intrauterine device surveillance  -IUD strings visualized on exam          Fabiola Torres MD

## 2025-01-16 NOTE — TELEPHONE ENCOUNTER
Pt returned call / offered appt with Dr Torres tomorrow am for vaginal bump / pt aware that Dr Torres will only addressing vag bump / not cx polyp / Appt sched for 01/17/2025 at 8am ok per Joann)

## 2025-01-16 NOTE — TELEPHONE ENCOUNTER
Est pt was sched for cx polyp removal  today and has been resched to 02/11/2025 due to wc out of office / pt was planing on having Dr Nicolas look at a small bump on left side of vaginal wall / denies pain pt  states that she was feeling pressure like feeling in left labia denies lump or bump / which then caused her to check vagina /pt being tx for kidney stone and not sure if this is related / pt requesting an appt sooner to have vaginal concern checked / pt will be out of town 01/31-02/05

## 2025-01-17 ENCOUNTER — OFFICE VISIT (OUTPATIENT)
Dept: OBSTETRICS AND GYNECOLOGY | Facility: CLINIC | Age: 60
End: 2025-01-17
Payer: COMMERCIAL

## 2025-01-17 VITALS — BODY MASS INDEX: 25.4 KG/M2 | WEIGHT: 148 LBS | SYSTOLIC BLOOD PRESSURE: 126 MMHG | DIASTOLIC BLOOD PRESSURE: 78 MMHG

## 2025-01-17 DIAGNOSIS — Z30.431 INTRAUTERINE DEVICE SURVEILLANCE: ICD-10-CM

## 2025-01-17 DIAGNOSIS — R30.0 DYSURIA: ICD-10-CM

## 2025-01-17 DIAGNOSIS — N89.8 VAGINAL LESION: Primary | ICD-10-CM

## 2025-01-17 LAB
POC APPEARANCE, URINE: CLEAR
POC BILIRUBIN, URINE: NEGATIVE
POC BLOOD, URINE: ABNORMAL
POC COLOR, URINE: YELLOW
POC GLUCOSE, URINE: NEGATIVE MG/DL
POC KETONES, URINE: NEGATIVE MG/DL
POC LEUKOCYTES, URINE: ABNORMAL
POC NITRITE,URINE: NEGATIVE
POC PH, URINE: 8.5 PH
POC PROTEIN, URINE: NEGATIVE MG/DL
POC SPECIFIC GRAVITY, URINE: 1.01
POC UROBILINOGEN, URINE: 0.2 EU/DL

## 2025-01-17 PROCEDURE — 1036F TOBACCO NON-USER: CPT | Performed by: STUDENT IN AN ORGANIZED HEALTH CARE EDUCATION/TRAINING PROGRAM

## 2025-01-17 PROCEDURE — 99213 OFFICE O/P EST LOW 20 MIN: CPT | Performed by: STUDENT IN AN ORGANIZED HEALTH CARE EDUCATION/TRAINING PROGRAM

## 2025-01-17 PROCEDURE — 87086 URINE CULTURE/COLONY COUNT: CPT | Performed by: STUDENT IN AN ORGANIZED HEALTH CARE EDUCATION/TRAINING PROGRAM

## 2025-01-17 PROCEDURE — 81003 URINALYSIS AUTO W/O SCOPE: CPT | Performed by: STUDENT IN AN ORGANIZED HEALTH CARE EDUCATION/TRAINING PROGRAM

## 2025-01-17 ASSESSMENT — ENCOUNTER SYMPTOMS
DEPRESSION: 0
OCCASIONAL FEELINGS OF UNSTEADINESS: 0
LOSS OF SENSATION IN FEET: 0

## 2025-01-17 ASSESSMENT — PATIENT HEALTH QUESTIONNAIRE - PHQ9
1. LITTLE INTEREST OR PLEASURE IN DOING THINGS: NOT AT ALL
SUM OF ALL RESPONSES TO PHQ9 QUESTIONS 1 & 2: 0
2. FEELING DOWN, DEPRESSED OR HOPELESS: NOT AT ALL

## 2025-01-17 ASSESSMENT — PAIN SCALES - GENERAL: PAINLEVEL_OUTOF10: 3

## 2025-01-18 LAB — BACTERIA UR CULT: NORMAL

## 2025-01-30 ENCOUNTER — TELEPHONE (OUTPATIENT)
Dept: PRIMARY CARE | Facility: CLINIC | Age: 60
End: 2025-01-30
Payer: COMMERCIAL

## 2025-01-30 NOTE — TELEPHONE ENCOUNTER
Patient would like to change from [facility] to [facility]? Patient was a [facility] patient. No offense to A , she just would like to stick to a NP female. Ok with both providers to switch?

## 2025-02-07 ENCOUNTER — APPOINTMENT (OUTPATIENT)
Dept: PRIMARY CARE | Facility: CLINIC | Age: 60
End: 2025-02-07
Payer: COMMERCIAL

## 2025-02-07 NOTE — PROGRESS NOTES
Kalli here for removal cervical polyp and iud removal/expires 04/2025; serial fsh confirm menopause.  IUD Removal    Performed by: Racheal Burdick MD  Authorized by: Racheal Burdick MD    Procedure: IUD removal    Consent obtained by patient, parent, or legal power of  - including discussion of procedure risks and benefits, patient questions answered, and patient education provided: yes    Reason for removal: patient request    Strings visualized: yes    Cervix cleaned with: chlorhexidine    Tenaculum applied to cervix: no    IUD grasped by forceps: yes    Performed with ultrasound guidance: no    IUD removed: yes    Date/Time of Removal:  2/11/2025 11:00 AM  Removed without complications: yes    IUD intact: yes    Cervix manually dilated: no      2ND PROCEDURE:  Indication:  removal sessile polyp from cervix  Procedure: After an informed consent was obtained. She was then positioned in the dorsal lithotomy position. A coated speculum was placed and the cervix was visualized.   Prep= Betasept. Anesthesia= 1% lidocaine w epi.   A  size 1.0 x 1.0 cm loop electrode was used to excise the sessile polyp from the cervix.  Hemostasis with cautery .  EBL= 2  Pt tolerated very well; post LEEP instruction sheet given; instructions okay. Advised pelvic rest x 2 weeks; healing check appt 2 wks.  Racheal burdick MD

## 2025-02-11 ENCOUNTER — PROCEDURE VISIT (OUTPATIENT)
Dept: OBSTETRICS AND GYNECOLOGY | Facility: CLINIC | Age: 60
End: 2025-02-11
Payer: COMMERCIAL

## 2025-02-11 VITALS
HEIGHT: 64 IN | DIASTOLIC BLOOD PRESSURE: 64 MMHG | SYSTOLIC BLOOD PRESSURE: 102 MMHG | WEIGHT: 148.6 LBS | BODY MASS INDEX: 25.37 KG/M2

## 2025-02-11 DIAGNOSIS — Z30.432 ENCOUNTER FOR IUD REMOVAL: Primary | ICD-10-CM

## 2025-02-11 DIAGNOSIS — N84.1 CERVICAL POLYP: ICD-10-CM

## 2025-02-11 PROCEDURE — 57500 BIOPSY OF CERVIX: CPT | Performed by: OBSTETRICS & GYNECOLOGY

## 2025-02-11 PROCEDURE — 58301 REMOVE INTRAUTERINE DEVICE: CPT | Performed by: OBSTETRICS & GYNECOLOGY

## 2025-02-11 PROCEDURE — 57500 BIOPSY OF CERVIX: CPT | Mod: 59 | Performed by: OBSTETRICS & GYNECOLOGY

## 2025-02-11 RX ORDER — OMEPRAZOLE 20 MG/1
20 CAPSULE, DELAYED RELEASE ORAL
COMMUNITY

## 2025-02-11 ASSESSMENT — LIFESTYLE VARIABLES
HOW MANY STANDARD DRINKS CONTAINING ALCOHOL DO YOU HAVE ON A TYPICAL DAY: 1 OR 2
HOW OFTEN DO YOU HAVE A DRINK CONTAINING ALCOHOL: 2-4 TIMES A MONTH
HOW OFTEN DO YOU HAVE SIX OR MORE DRINKS ON ONE OCCASION: NEVER
AUDIT-C TOTAL SCORE: 2
SKIP TO QUESTIONS 9-10: 1

## 2025-02-11 ASSESSMENT — ENCOUNTER SYMPTOMS
LOSS OF SENSATION IN FEET: 0
OCCASIONAL FEELINGS OF UNSTEADINESS: 0
DEPRESSION: 0

## 2025-02-11 ASSESSMENT — PAIN SCALES - GENERAL: PAINLEVEL_OUTOF10: 0-NO PAIN

## 2025-02-17 LAB
LABORATORY COMMENT REPORT: NORMAL
PATH REPORT.FINAL DX SPEC: NORMAL
PATH REPORT.GROSS SPEC: NORMAL
PATH REPORT.RELEVANT HX SPEC: NORMAL
PATH REPORT.TOTAL CANCER: NORMAL

## 2025-02-23 NOTE — PROGRESS NOTES
"Kalli Quintero last seen 2/11/2025 for removal of IUD and LEEP excision of sessile cervical polyp ;comes in for healing check; pathology evaluation of cervical polyp confirms benign tissue.  Last blood-tinged discharge 2 days ago/only tan seen; no pain.  Patient has avoided any mucosal treatments for intimate activity until exam today clears her.   Visit Vitals  /64   Ht 1.626 m (5' 4\")   Wt 67.8 kg (149 lb 6.4 oz)   BMI 25.64 kg/m²   OB Status Menopausal   Smoking Status Never   BSA 1.75 m²   Constitutional/general: Well developed...Well nourished...  Neuro/psych: Oriented x 3; Mood/affect  Gyn:   External genitalia: Grossly normal  Urethra: Grossly normal  Vagina: Mucosa pink; no munira blood; cervix site swabbed; mucous plug with tan streak of old blood removed; no evidence of infection; healing appears complete  Encounter Diagnoses   Name Primary?    Postop check; healing appears complete on exam today; okay to resume all normal activities     Cervical polyp; path confirms benign tissue; resume routine surveillance Yes   Racheal Nicolas MD        "

## 2025-02-26 ENCOUNTER — OFFICE VISIT (OUTPATIENT)
Dept: OBSTETRICS AND GYNECOLOGY | Facility: CLINIC | Age: 60
End: 2025-02-26
Payer: COMMERCIAL

## 2025-02-26 VITALS
BODY MASS INDEX: 25.51 KG/M2 | DIASTOLIC BLOOD PRESSURE: 64 MMHG | HEIGHT: 64 IN | SYSTOLIC BLOOD PRESSURE: 106 MMHG | WEIGHT: 149.4 LBS

## 2025-02-26 DIAGNOSIS — Z09 POSTOP CHECK: ICD-10-CM

## 2025-02-26 DIAGNOSIS — N84.1 CERVICAL POLYP: Primary | ICD-10-CM

## 2025-02-26 PROCEDURE — 99211 OFF/OP EST MAY X REQ PHY/QHP: CPT | Performed by: OBSTETRICS & GYNECOLOGY

## 2025-02-26 PROCEDURE — 1036F TOBACCO NON-USER: CPT | Performed by: OBSTETRICS & GYNECOLOGY

## 2025-02-26 PROCEDURE — 3008F BODY MASS INDEX DOCD: CPT | Performed by: OBSTETRICS & GYNECOLOGY

## 2025-02-26 RX ORDER — FAMOTIDINE 10 MG/1
TABLET ORAL
COMMUNITY

## 2025-02-26 ASSESSMENT — ENCOUNTER SYMPTOMS
OCCASIONAL FEELINGS OF UNSTEADINESS: 0
DEPRESSION: 0
LOSS OF SENSATION IN FEET: 0

## 2025-02-26 ASSESSMENT — PAIN SCALES - GENERAL: PAINLEVEL_OUTOF10: 0-NO PAIN

## 2025-02-26 ASSESSMENT — LIFESTYLE VARIABLES
HOW OFTEN DO YOU HAVE A DRINK CONTAINING ALCOHOL: 2-4 TIMES A MONTH
HOW OFTEN DO YOU HAVE SIX OR MORE DRINKS ON ONE OCCASION: NEVER
HOW MANY STANDARD DRINKS CONTAINING ALCOHOL DO YOU HAVE ON A TYPICAL DAY: 1 OR 2
SKIP TO QUESTIONS 9-10: 1
AUDIT-C TOTAL SCORE: 2

## 2025-03-14 ENCOUNTER — TELEPHONE (OUTPATIENT)
Dept: PRIMARY CARE | Facility: CLINIC | Age: 60
End: 2025-03-14

## 2025-03-14 ENCOUNTER — APPOINTMENT (OUTPATIENT)
Dept: PRIMARY CARE | Facility: CLINIC | Age: 60
End: 2025-03-14
Payer: COMMERCIAL

## 2025-03-14 ENCOUNTER — OFFICE VISIT (OUTPATIENT)
Dept: PRIMARY CARE | Facility: CLINIC | Age: 60
End: 2025-03-14
Payer: COMMERCIAL

## 2025-03-14 VITALS
SYSTOLIC BLOOD PRESSURE: 124 MMHG | TEMPERATURE: 97 F | HEART RATE: 76 BPM | WEIGHT: 148.6 LBS | DIASTOLIC BLOOD PRESSURE: 82 MMHG | OXYGEN SATURATION: 98 % | HEIGHT: 64 IN | BODY MASS INDEX: 25.37 KG/M2

## 2025-03-14 DIAGNOSIS — E78.2 MIXED HYPERLIPIDEMIA: Primary | ICD-10-CM

## 2025-03-14 DIAGNOSIS — E55.9 VITAMIN D DEFICIENCY: Primary | ICD-10-CM

## 2025-03-14 DIAGNOSIS — N20.0 RENAL CALCULI: ICD-10-CM

## 2025-03-14 DIAGNOSIS — E03.9 ACQUIRED HYPOTHYROIDISM: ICD-10-CM

## 2025-03-14 DIAGNOSIS — Z00.00 GENERAL MEDICAL EXAM: ICD-10-CM

## 2025-03-14 DIAGNOSIS — E78.2 MIXED HYPERLIPIDEMIA: ICD-10-CM

## 2025-03-14 DIAGNOSIS — N20.0 RENAL CALCULI: Primary | ICD-10-CM

## 2025-03-14 PROCEDURE — 3008F BODY MASS INDEX DOCD: CPT

## 2025-03-14 PROCEDURE — 99214 OFFICE O/P EST MOD 30 MIN: CPT

## 2025-03-14 PROCEDURE — G8433 SCR FOR DEP NOT CPT DOC RSN: HCPCS

## 2025-03-14 ASSESSMENT — PATIENT HEALTH QUESTIONNAIRE - PHQ9
SUM OF ALL RESPONSES TO PHQ9 QUESTIONS 1 AND 2: 0
2. FEELING DOWN, DEPRESSED OR HOPELESS: NOT AT ALL
1. LITTLE INTEREST OR PLEASURE IN DOING THINGS: NOT AT ALL

## 2025-03-14 ASSESSMENT — COLUMBIA-SUICIDE SEVERITY RATING SCALE - C-SSRS
6. HAVE YOU EVER DONE ANYTHING, STARTED TO DO ANYTHING, OR PREPARED TO DO ANYTHING TO END YOUR LIFE?: NO
1. IN THE PAST MONTH, HAVE YOU WISHED YOU WERE DEAD OR WISHED YOU COULD GO TO SLEEP AND NOT WAKE UP?: NO
2. HAVE YOU ACTUALLY HAD ANY THOUGHTS OF KILLING YOURSELF?: NO

## 2025-03-14 NOTE — PROGRESS NOTES
"Subjective     Patient ID: Kalli Quintero is a 59 y.o. female who presents for Hyperlipidemia (Blood work questions/ Right foot pain).      HPI  Kalli presents for follow-up of hyperlipidemia, hypothyroidism and persistent concerns for renal stones.    Patient's recent visit notes, medication and allergy lists, past medical surgical social hx, immunization, vitals, problem list, recent tests were reviewed by me for pertinence to this visit.    Review of Systems   Constitutional:  Negative for activity change, appetite change, fatigue and unexpected weight change.   HENT: Negative.     Eyes:  Negative for visual disturbance.   Respiratory:  Negative for cough, chest tightness and shortness of breath.    Cardiovascular:  Negative for chest pain, palpitations and leg swelling.   Genitourinary:  Negative for difficulty urinating, dysuria, flank pain, frequency and hematuria.   Neurological: Negative.        Objective   /82 (BP Location: Left arm, Patient Position: Sitting, BP Cuff Size: Adult)   Pulse 76   Temp 36.1 °C (97 °F) (Temporal)   Ht 1.626 m (5' 4\")   Wt 67.4 kg (148 lb 9.6 oz)   SpO2 98%   BMI 25.51 kg/m²       Physical Exam  Vitals and nursing note reviewed.   Constitutional:       General: She is not in acute distress.     Appearance: Normal appearance.   Neck:      Vascular: No carotid bruit.   Cardiovascular:      Rate and Rhythm: Normal rate and regular rhythm.      Pulses: Normal pulses.      Heart sounds: Normal heart sounds, S1 normal and S2 normal. No murmur heard.  Pulmonary:      Effort: Pulmonary effort is normal.      Breath sounds: Normal breath sounds and air entry.   Musculoskeletal:      Cervical back: Normal range of motion and neck supple.      Right lower leg: No edema.      Left lower leg: No edema.   Lymphadenopathy:      Cervical: No cervical adenopathy.   Skin:     General: Skin is warm and dry.   Neurological:      General: No focal deficit present.      Mental Status: She is " alert. Mental status is at baseline.   Psychiatric:         Behavior: Behavior is cooperative.       Assessment & Plan  Mixed hyperlipidemia  Chronic condition  Most recent lipid panel reviewed with patient  ASCVD score risk is low 2.9%  Cardiovascular risks discussed and, if needed, lifestyle modifications recommended, including nutritional choices, exercise, and elimination of habits contributing to risk.  We agreed on a plan to reduce the current cardiovascular risk.    We will plan to recheck levels in 6 months       Acquired hypothyroidism  Chronic condition, stable at this visit.   No s/sx hypo or hyperthyroid.  Continue levothyroxine as prescribed on empty stomach.  Labs reviewed  Follow up in 6 months.          Renal calculi  Persistent concern for patient, although her symptoms have resolved at this visit  UA shows calcium oxalate crystals  Will have her follow-up with urology as discussed         Jennifer Beaulieu, APRN-CNP

## 2025-03-18 ASSESSMENT — ENCOUNTER SYMPTOMS
HEMATURIA: 0
UNEXPECTED WEIGHT CHANGE: 0
DYSURIA: 0
NEUROLOGICAL NEGATIVE: 1
SHORTNESS OF BREATH: 0
CHEST TIGHTNESS: 0
PALPITATIONS: 0
FREQUENCY: 0
FLANK PAIN: 0
APPETITE CHANGE: 0
COUGH: 0
FATIGUE: 0
DIFFICULTY URINATING: 0
ACTIVITY CHANGE: 0

## 2025-03-18 NOTE — ASSESSMENT & PLAN NOTE
Chronic condition, stable at this visit.   No s/sx hypo or hyperthyroid.  Continue levothyroxine as prescribed on empty stomach.  Labs reviewed  Follow up in 6 months.

## 2025-03-18 NOTE — ASSESSMENT & PLAN NOTE
Chronic condition  Most recent lipid panel reviewed with patient  ASCVD score risk is low 2.9%  Cardiovascular risks discussed and, if needed, lifestyle modifications recommended, including nutritional choices, exercise, and elimination of habits contributing to risk.  We agreed on a plan to reduce the current cardiovascular risk.    We will plan to recheck levels in 6 months

## 2025-05-07 ENCOUNTER — PATIENT MESSAGE (OUTPATIENT)
Dept: OBSTETRICS AND GYNECOLOGY | Facility: CLINIC | Age: 60
End: 2025-05-07
Payer: COMMERCIAL

## 2025-05-09 ENCOUNTER — HOSPITAL ENCOUNTER (OUTPATIENT)
Dept: RADIOLOGY | Facility: HOSPITAL | Age: 60
Discharge: HOME | End: 2025-05-09
Payer: COMMERCIAL

## 2025-05-09 DIAGNOSIS — Z87.442 PERSONAL HISTORY OF URINARY CALCULI: ICD-10-CM

## 2025-05-09 PROCEDURE — 76770 US EXAM ABDO BACK WALL COMP: CPT

## 2025-05-09 PROCEDURE — 76770 US EXAM ABDO BACK WALL COMP: CPT | Performed by: RADIOLOGY

## 2025-05-11 NOTE — PROGRESS NOTES
"Kalli Quintero comes in for gyn concern of discuss hrt for tx menopause; researching wellness strategies for heart/brain/musculoskeletal/renal health--excellent baseline knowledge in preparation for visit/patient has used both [place] [employer] and the new menopause as her resources prior to this visit;   interval dx kidney stones; currently still has 8 mm stone in situ; very concerned by oxalate restrictive diet/neg impact on nutrition.   LMP 06/2021 on Mirena--removed 2/11/2025; FSH 78.6, 2021.  Urogyn sxs present; no appreciable vaso sxs; sleep ok; muscle aches; possible mood lability/feeling more stressed/wt stable.  Labs 3/14/2025: total chol 260/HDL 55/   Last mamm 11/2024 neg/heterog dense tissue  PMHx: NEG for known vasc dz/NO fam hx vasc dz/thrombosis  Visit Vitals  /64   Ht 1.626 m (5' 4\")   Wt 68.1 kg (150 lb 3.2 oz)   BMI 25.78 kg/m²   OB Status Menopausal   Smoking Status Never   BSA 1.75 m²        Constitutional/general: Well developed/Well nourished.  Neuro/psych: oriented x 3; Mood/affect--slightly tearful in discussing kidney stones  Gyn: Deferred for consult.  Encounter Diagnoses   Name Primary?    Menopause; detailed review of current guidelines on HRT; reviewed multiple treatment options ;detailed discussion of risk and benefits as currently known; patient wanting trial of transdermal estrogen with micronized progesterone.  Will follow-up in 12 weeks. Yes    Postmenopausal atrophic vaginitis; expected to improve on systemic therapy.     Hormone replacement therapy; both review of medical history and review of symptoms and review of family history are all negative for any known contraindications.  I specifically discussed possible increased breast density.  Patient also advised she may expect small amounts of bleeding within first 12 weeks of therapy.    Time Based Billing 2025:          Prep time:  ___5__ minutes.          Additional history:  __10___ minutes.          " Face to Face time w patient/family/caregiver:  ___40_ minutes.          Counseling/Coordination of care:  ___5__ minutes.          Ordering medication/testing/procedures:  2____ minutes.          External communications:  ___0__ minutes.          Documentation time:  ___5 minutes.          Total time on date of encounter:             Total minutes:  45  Racheal Nicolas MD

## 2025-05-13 ENCOUNTER — OFFICE VISIT (OUTPATIENT)
Dept: OBSTETRICS AND GYNECOLOGY | Facility: CLINIC | Age: 60
End: 2025-05-13
Payer: COMMERCIAL

## 2025-05-13 VITALS
SYSTOLIC BLOOD PRESSURE: 110 MMHG | BODY MASS INDEX: 25.64 KG/M2 | WEIGHT: 150.2 LBS | HEIGHT: 64 IN | DIASTOLIC BLOOD PRESSURE: 64 MMHG

## 2025-05-13 DIAGNOSIS — Z78.0 MENOPAUSE: Primary | ICD-10-CM

## 2025-05-13 DIAGNOSIS — Z79.890 HORMONE REPLACEMENT THERAPY: ICD-10-CM

## 2025-05-13 DIAGNOSIS — N95.2 POSTMENOPAUSAL ATROPHIC VAGINITIS: ICD-10-CM

## 2025-05-13 PROCEDURE — 99214 OFFICE O/P EST MOD 30 MIN: CPT | Performed by: OBSTETRICS & GYNECOLOGY

## 2025-05-13 PROCEDURE — 3008F BODY MASS INDEX DOCD: CPT | Performed by: OBSTETRICS & GYNECOLOGY

## 2025-05-13 PROCEDURE — 1036F TOBACCO NON-USER: CPT | Performed by: OBSTETRICS & GYNECOLOGY

## 2025-05-13 RX ORDER — CETIRIZINE HYDROCHLORIDE 10 MG/1
10 TABLET ORAL DAILY
COMMUNITY

## 2025-05-13 RX ORDER — PROGESTERONE 100 MG/1
CAPSULE ORAL
Qty: 180 CAPSULE | Refills: 1 | Status: SHIPPED | OUTPATIENT
Start: 2025-05-13

## 2025-05-13 RX ORDER — PSYLLIUM HUSK 0.4 G
5 CAPSULE ORAL 4 TIMES DAILY
COMMUNITY

## 2025-05-13 RX ORDER — ESTRADIOL 0.05 MG/D
1 FILM, EXTENDED RELEASE TRANSDERMAL 2 TIMES WEEKLY
Qty: 24 PATCH | Refills: 1 | Status: SHIPPED | OUTPATIENT
Start: 2025-05-15 | End: 2026-05-15

## 2025-05-13 ASSESSMENT — LIFESTYLE VARIABLES
HOW MANY STANDARD DRINKS CONTAINING ALCOHOL DO YOU HAVE ON A TYPICAL DAY: 1 OR 2
SKIP TO QUESTIONS 9-10: 1
HOW OFTEN DO YOU HAVE SIX OR MORE DRINKS ON ONE OCCASION: NEVER
HOW OFTEN DO YOU HAVE A DRINK CONTAINING ALCOHOL: MONTHLY OR LESS
AUDIT-C TOTAL SCORE: 1

## 2025-05-13 ASSESSMENT — ENCOUNTER SYMPTOMS
LOSS OF SENSATION IN FEET: 0
DEPRESSION: 0
OCCASIONAL FEELINGS OF UNSTEADINESS: 0

## 2025-05-13 ASSESSMENT — PAIN SCALES - GENERAL: PAINLEVEL_OUTOF10: 0-NO PAIN

## 2025-05-15 ENCOUNTER — HOSPITAL ENCOUNTER (OUTPATIENT)
Dept: RADIOLOGY | Facility: HOSPITAL | Age: 60
Discharge: HOME | End: 2025-05-15
Payer: COMMERCIAL

## 2025-05-15 DIAGNOSIS — N20.0 CALCULUS OF KIDNEY: ICD-10-CM

## 2025-05-15 PROCEDURE — 74018 RADEX ABDOMEN 1 VIEW: CPT | Performed by: RADIOLOGY

## 2025-05-15 PROCEDURE — 74018 RADEX ABDOMEN 1 VIEW: CPT

## 2025-08-12 ENCOUNTER — OFFICE VISIT (OUTPATIENT)
Dept: OBSTETRICS AND GYNECOLOGY | Facility: CLINIC | Age: 60
End: 2025-08-12
Payer: COMMERCIAL

## 2025-08-12 ENCOUNTER — TELEPHONE (OUTPATIENT)
Dept: OBSTETRICS AND GYNECOLOGY | Facility: CLINIC | Age: 60
End: 2025-08-12

## 2025-08-12 VITALS
DIASTOLIC BLOOD PRESSURE: 66 MMHG | BODY MASS INDEX: 25.1 KG/M2 | WEIGHT: 147 LBS | HEIGHT: 64 IN | SYSTOLIC BLOOD PRESSURE: 114 MMHG

## 2025-08-12 DIAGNOSIS — N95.2 POSTMENOPAUSAL ATROPHIC VAGINITIS: ICD-10-CM

## 2025-08-12 DIAGNOSIS — Z12.31 ENCOUNTER FOR SCREENING MAMMOGRAM FOR MALIGNANT NEOPLASM OF BREAST: ICD-10-CM

## 2025-08-12 DIAGNOSIS — Z79.890 HORMONE REPLACEMENT THERAPY: ICD-10-CM

## 2025-08-12 DIAGNOSIS — Z78.0 MENOPAUSE: Primary | ICD-10-CM

## 2025-08-12 PROCEDURE — 99212 OFFICE O/P EST SF 10 MIN: CPT | Performed by: OBSTETRICS & GYNECOLOGY

## 2025-08-12 PROCEDURE — 1036F TOBACCO NON-USER: CPT | Performed by: OBSTETRICS & GYNECOLOGY

## 2025-08-12 PROCEDURE — 99213 OFFICE O/P EST LOW 20 MIN: CPT | Performed by: OBSTETRICS & GYNECOLOGY

## 2025-08-12 PROCEDURE — 3008F BODY MASS INDEX DOCD: CPT | Performed by: OBSTETRICS & GYNECOLOGY

## 2025-08-12 RX ORDER — PROGESTERONE 100 MG/1
CAPSULE ORAL
Qty: 180 CAPSULE | Refills: 3 | Status: SHIPPED | OUTPATIENT
Start: 2025-08-12 | End: 2025-08-13 | Stop reason: SDUPTHER

## 2025-08-12 RX ORDER — ESTRADIOL 0.05 MG/D
1 FILM, EXTENDED RELEASE TRANSDERMAL 2 TIMES WEEKLY
Qty: 24 PATCH | Refills: 3 | Status: SHIPPED | OUTPATIENT
Start: 2025-08-14 | End: 2025-08-13 | Stop reason: SDUPTHER

## 2025-08-12 ASSESSMENT — ENCOUNTER SYMPTOMS
DEPRESSION: 0
LOSS OF SENSATION IN FEET: 0
OCCASIONAL FEELINGS OF UNSTEADINESS: 0

## 2025-08-12 ASSESSMENT — LIFESTYLE VARIABLES
HOW MANY STANDARD DRINKS CONTAINING ALCOHOL DO YOU HAVE ON A TYPICAL DAY: 1 OR 2
HOW OFTEN DO YOU HAVE SIX OR MORE DRINKS ON ONE OCCASION: NEVER
HOW MANY STANDARD DRINKS CONTAINING ALCOHOL DO YOU HAVE ON A TYPICAL DAY: 1 OR 2
SKIP TO QUESTIONS 9-10: 1
HOW OFTEN DO YOU HAVE A DRINK CONTAINING ALCOHOL: 2-4 TIMES A MONTH
AUDIT-C TOTAL SCORE: 2
SKIP TO QUESTIONS 9-10: 1
HOW OFTEN DO YOU HAVE SIX OR MORE DRINKS ON ONE OCCASION: NEVER
HOW OFTEN DO YOU HAVE A DRINK CONTAINING ALCOHOL: 2-4 TIMES A MONTH
AUDIT-C TOTAL SCORE: 2

## 2025-08-12 ASSESSMENT — PAIN SCALES - GENERAL: PAINLEVEL_OUTOF10: 0-NO PAIN

## 2025-08-13 DIAGNOSIS — Z78.0 MENOPAUSE: ICD-10-CM

## 2025-08-13 DIAGNOSIS — Z79.890 HORMONE REPLACEMENT THERAPY: ICD-10-CM

## 2025-08-13 RX ORDER — PROGESTERONE 100 MG/1
CAPSULE ORAL
Qty: 180 CAPSULE | Refills: 3 | Status: SHIPPED | OUTPATIENT
Start: 2025-08-13

## 2025-08-13 RX ORDER — ESTRADIOL 0.05 MG/D
1 FILM, EXTENDED RELEASE TRANSDERMAL 2 TIMES WEEKLY
Qty: 24 PATCH | Refills: 3 | Status: SHIPPED | OUTPATIENT
Start: 2025-08-14 | End: 2026-08-14

## 2025-09-15 ENCOUNTER — APPOINTMENT (OUTPATIENT)
Dept: OTOLARYNGOLOGY | Facility: CLINIC | Age: 60
End: 2025-09-15
Payer: COMMERCIAL

## 2025-09-16 ENCOUNTER — APPOINTMENT (OUTPATIENT)
Dept: PRIMARY CARE | Facility: CLINIC | Age: 60
End: 2025-09-16
Payer: COMMERCIAL

## 2025-12-04 ENCOUNTER — APPOINTMENT (OUTPATIENT)
Dept: RADIOLOGY | Facility: HOSPITAL | Age: 60
End: 2025-12-04
Payer: COMMERCIAL